# Patient Record
Sex: MALE | Race: ASIAN | NOT HISPANIC OR LATINO | Employment: PART TIME | URBAN - METROPOLITAN AREA
[De-identification: names, ages, dates, MRNs, and addresses within clinical notes are randomized per-mention and may not be internally consistent; named-entity substitution may affect disease eponyms.]

---

## 2018-01-07 ENCOUNTER — APPOINTMENT (EMERGENCY)
Dept: RADIOLOGY | Facility: HOSPITAL | Age: 23
End: 2018-01-07
Payer: COMMERCIAL

## 2018-01-07 ENCOUNTER — HOSPITAL ENCOUNTER (OUTPATIENT)
Facility: HOSPITAL | Age: 23
Setting detail: OBSERVATION
Discharge: HOME | End: 2018-01-08
Attending: EMERGENCY MEDICINE | Admitting: INTERNAL MEDICINE
Payer: COMMERCIAL

## 2018-01-07 DIAGNOSIS — G40.409 GRAND MAL SEIZURE (HCC): Primary | ICD-10-CM

## 2018-01-07 LAB
ALBUMIN SERPL BCP-MCNC: 4.2 G/DL (ref 3.5–5)
ALP SERPL-CCNC: 67 U/L (ref 46–116)
ALT SERPL W P-5'-P-CCNC: 27 U/L (ref 12–78)
ANION GAP SERPL CALCULATED.3IONS-SCNC: 22 MMOL/L (ref 4–13)
AST SERPL W P-5'-P-CCNC: 25 U/L (ref 5–45)
BASOPHILS # BLD AUTO: 0.03 THOUSANDS/ΜL (ref 0–0.1)
BASOPHILS NFR BLD AUTO: 0 % (ref 0–1)
BILIRUB SERPL-MCNC: 0.33 MG/DL (ref 0.2–1)
BUN SERPL-MCNC: 14 MG/DL (ref 5–25)
CALCIUM SERPL-MCNC: 8.7 MG/DL (ref 8.3–10.1)
CHLORIDE SERPL-SCNC: 99 MMOL/L (ref 100–108)
CO2 SERPL-SCNC: 16 MMOL/L (ref 21–32)
CREAT SERPL-MCNC: 1.16 MG/DL (ref 0.6–1.3)
EOSINOPHIL # BLD AUTO: 0.33 THOUSAND/ΜL (ref 0–0.61)
EOSINOPHIL NFR BLD AUTO: 3 % (ref 0–6)
ERYTHROCYTE [DISTWIDTH] IN BLOOD BY AUTOMATED COUNT: 12.7 % (ref 11.6–15.1)
GFR SERPL CREATININE-BSD FRML MDRD: 90 ML/MIN/1.73SQ M
GLUCOSE SERPL-MCNC: 128 MG/DL (ref 65–140)
HCT VFR BLD AUTO: 47.9 % (ref 36.5–49.3)
HGB BLD-MCNC: 16.3 G/DL (ref 12–17)
LYMPHOCYTES # BLD AUTO: 5.23 THOUSANDS/ΜL (ref 0.6–4.47)
LYMPHOCYTES NFR BLD AUTO: 48 % (ref 14–44)
MAGNESIUM SERPL-MCNC: 2.5 MG/DL (ref 1.6–2.6)
MCH RBC QN AUTO: 30.9 PG (ref 26.8–34.3)
MCHC RBC AUTO-ENTMCNC: 34 G/DL (ref 31.4–37.4)
MCV RBC AUTO: 91 FL (ref 82–98)
MONOCYTES # BLD AUTO: 1.02 THOUSAND/ΜL (ref 0.17–1.22)
MONOCYTES NFR BLD AUTO: 9 % (ref 4–12)
NEUTROPHILS # BLD AUTO: 4.43 THOUSANDS/ΜL (ref 1.85–7.62)
NEUTS SEG NFR BLD AUTO: 40 % (ref 43–75)
NRBC BLD AUTO-RTO: 0 /100 WBCS
PHOSPHATE SERPL-MCNC: 4.3 MG/DL (ref 2.7–4.5)
PLATELET # BLD AUTO: 309 THOUSANDS/UL (ref 149–390)
PMV BLD AUTO: 10 FL (ref 8.9–12.7)
POTASSIUM SERPL-SCNC: 3.7 MMOL/L (ref 3.5–5.3)
PROT SERPL-MCNC: 7.9 G/DL (ref 6.4–8.2)
RBC # BLD AUTO: 5.27 MILLION/UL (ref 3.88–5.62)
SODIUM SERPL-SCNC: 137 MMOL/L (ref 136–145)
WBC # BLD AUTO: 11.1 THOUSAND/UL (ref 4.31–10.16)

## 2018-01-07 PROCEDURE — 84100 ASSAY OF PHOSPHORUS: CPT | Performed by: EMERGENCY MEDICINE

## 2018-01-07 PROCEDURE — 93005 ELECTROCARDIOGRAM TRACING: CPT

## 2018-01-07 PROCEDURE — 36415 COLL VENOUS BLD VENIPUNCTURE: CPT | Performed by: EMERGENCY MEDICINE

## 2018-01-07 PROCEDURE — 70450 CT HEAD/BRAIN W/O DYE: CPT

## 2018-01-07 PROCEDURE — 80053 COMPREHEN METABOLIC PANEL: CPT | Performed by: EMERGENCY MEDICINE

## 2018-01-07 PROCEDURE — 85025 COMPLETE CBC W/AUTO DIFF WBC: CPT | Performed by: EMERGENCY MEDICINE

## 2018-01-07 PROCEDURE — 96365 THER/PROPH/DIAG IV INF INIT: CPT

## 2018-01-07 PROCEDURE — 83735 ASSAY OF MAGNESIUM: CPT | Performed by: EMERGENCY MEDICINE

## 2018-01-07 RX ORDER — ESCITALOPRAM OXALATE 20 MG/1
5 TABLET ORAL DAILY
COMMUNITY
End: 2018-05-24

## 2018-01-07 RX ORDER — LORAZEPAM 2 MG/ML
INJECTION INTRAMUSCULAR
Status: DISPENSED
Start: 2018-01-07 | End: 2018-01-08

## 2018-01-07 RX ADMIN — SODIUM CHLORIDE 1000 MG: 900 INJECTION, SOLUTION INTRAVENOUS at 23:11

## 2018-01-07 NOTE — Clinical Note
Case was discussed with Dr Aron Parrish and the patient's admission status was agreed to be Observation to the service of Dr Aron Parrish

## 2018-01-07 NOTE — LETTER
January 8, 2018     Patient: Bree Goldsmith   YOB: 1995   Date of Visit: 1/7/2018       To Whom It May Concern: It is my medical opinion that Bree Goldsmith should remain out of work until 1/15/18  If you have any questions or concerns, please don't hesitate to call           Sincerely,          Deneen Morris PA-C

## 2018-01-08 ENCOUNTER — APPOINTMENT (OUTPATIENT)
Dept: RADIOLOGY | Facility: HOSPITAL | Age: 23
End: 2018-01-08
Payer: COMMERCIAL

## 2018-01-08 ENCOUNTER — APPOINTMENT (OUTPATIENT)
Dept: NEUROLOGY | Facility: AMBULATORY SURGERY CENTER | Age: 23
End: 2018-01-08
Payer: COMMERCIAL

## 2018-01-08 ENCOUNTER — GENERIC CONVERSION - ENCOUNTER (OUTPATIENT)
Dept: OTHER | Facility: OTHER | Age: 23
End: 2018-01-08

## 2018-01-08 VITALS
HEIGHT: 71 IN | SYSTOLIC BLOOD PRESSURE: 110 MMHG | HEART RATE: 75 BPM | RESPIRATION RATE: 16 BRPM | OXYGEN SATURATION: 96 % | TEMPERATURE: 97.6 F | DIASTOLIC BLOOD PRESSURE: 61 MMHG | BODY MASS INDEX: 17.92 KG/M2 | WEIGHT: 128 LBS

## 2018-01-08 PROBLEM — F41.9 ANXIETY: Status: ACTIVE | Noted: 2018-01-08

## 2018-01-08 PROBLEM — F90.9 ADHD: Status: ACTIVE | Noted: 2018-01-08

## 2018-01-08 PROBLEM — R56.9 SEIZURE (HCC): Status: ACTIVE | Noted: 2018-01-08

## 2018-01-08 PROBLEM — F84.0 AUTISM SPECTRUM: Status: ACTIVE | Noted: 2018-01-08

## 2018-01-08 PROBLEM — G40.409 GRAND MAL SEIZURE (HCC): Status: ACTIVE | Noted: 2018-01-08

## 2018-01-08 PROBLEM — G47.09 OTHER INSOMNIA: Status: ACTIVE | Noted: 2018-01-08

## 2018-01-08 LAB
AMPHETAMINES SERPL QL SCN: POSITIVE
ANION GAP SERPL CALCULATED.3IONS-SCNC: 6 MMOL/L (ref 4–13)
ATRIAL RATE: 105 BPM
ATRIAL RATE: 96 BPM
BARBITURATES UR QL: NEGATIVE
BENZODIAZ UR QL: NEGATIVE
BILIRUB UR QL STRIP: NEGATIVE
BUN SERPL-MCNC: 16 MG/DL (ref 5–25)
CALCIUM SERPL-MCNC: 8.4 MG/DL (ref 8.3–10.1)
CHLORIDE SERPL-SCNC: 106 MMOL/L (ref 100–108)
CLARITY UR: CLEAR
CO2 SERPL-SCNC: 27 MMOL/L (ref 21–32)
COCAINE UR QL: NEGATIVE
COLOR UR: YELLOW
CREAT SERPL-MCNC: 0.89 MG/DL (ref 0.6–1.3)
ERYTHROCYTE [DISTWIDTH] IN BLOOD BY AUTOMATED COUNT: 12.6 % (ref 11.6–15.1)
GFR SERPL CREATININE-BSD FRML MDRD: 123 ML/MIN/1.73SQ M
GLUCOSE SERPL-MCNC: 111 MG/DL (ref 65–140)
GLUCOSE UR STRIP-MCNC: NEGATIVE MG/DL
HCT VFR BLD AUTO: 42.3 % (ref 36.5–49.3)
HGB BLD-MCNC: 15 G/DL (ref 12–17)
HGB UR QL STRIP.AUTO: NEGATIVE
KETONES UR STRIP-MCNC: NEGATIVE MG/DL
LEUKOCYTE ESTERASE UR QL STRIP: NEGATIVE
MAGNESIUM SERPL-MCNC: 2.7 MG/DL (ref 1.6–2.6)
MCH RBC QN AUTO: 30.8 PG (ref 26.8–34.3)
MCHC RBC AUTO-ENTMCNC: 35.5 G/DL (ref 31.4–37.4)
MCV RBC AUTO: 87 FL (ref 82–98)
METHADONE UR QL: NEGATIVE
NITRITE UR QL STRIP: NEGATIVE
OPIATES UR QL SCN: NEGATIVE
P AXIS: 78 DEGREES
P AXIS: 82 DEGREES
PCP UR QL: NEGATIVE
PH UR STRIP.AUTO: 5.5 [PH] (ref 4.5–8)
PHOSPHATE SERPL-MCNC: 4.7 MG/DL (ref 2.7–4.5)
PLATELET # BLD AUTO: 270 THOUSANDS/UL (ref 149–390)
PMV BLD AUTO: 9.3 FL (ref 8.9–12.7)
POTASSIUM SERPL-SCNC: 3.9 MMOL/L (ref 3.5–5.3)
PR INTERVAL: 116 MS
PR INTERVAL: 146 MS
PROT UR STRIP-MCNC: NEGATIVE MG/DL
QRS AXIS: 86 DEGREES
QRS AXIS: 90 DEGREES
QRSD INTERVAL: 114 MS
QRSD INTERVAL: 118 MS
QT INTERVAL: 326 MS
QT INTERVAL: 328 MS
QTC INTERVAL: 414 MS
QTC INTERVAL: 430 MS
RBC # BLD AUTO: 4.87 MILLION/UL (ref 3.88–5.62)
SODIUM SERPL-SCNC: 139 MMOL/L (ref 136–145)
SP GR UR STRIP.AUTO: 1.01 (ref 1–1.03)
T WAVE AXIS: 64 DEGREES
T WAVE AXIS: 71 DEGREES
THC UR QL: NEGATIVE
UROBILINOGEN UR QL STRIP.AUTO: 0.2 E.U./DL
VENTRICULAR RATE: 105 BPM
VENTRICULAR RATE: 96 BPM
WBC # BLD AUTO: 13.81 THOUSAND/UL (ref 4.31–10.16)

## 2018-01-08 PROCEDURE — A9585 GADOBUTROL INJECTION: HCPCS | Performed by: EMERGENCY MEDICINE

## 2018-01-08 PROCEDURE — 36415 COLL VENOUS BLD VENIPUNCTURE: CPT | Performed by: INTERNAL MEDICINE

## 2018-01-08 PROCEDURE — 84100 ASSAY OF PHOSPHORUS: CPT | Performed by: INTERNAL MEDICINE

## 2018-01-08 PROCEDURE — 80307 DRUG TEST PRSMV CHEM ANLYZR: CPT | Performed by: EMERGENCY MEDICINE

## 2018-01-08 PROCEDURE — 99285 EMERGENCY DEPT VISIT HI MDM: CPT

## 2018-01-08 PROCEDURE — 83735 ASSAY OF MAGNESIUM: CPT | Performed by: INTERNAL MEDICINE

## 2018-01-08 PROCEDURE — 70553 MRI BRAIN STEM W/O & W/DYE: CPT

## 2018-01-08 PROCEDURE — 85027 COMPLETE CBC AUTOMATED: CPT | Performed by: INTERNAL MEDICINE

## 2018-01-08 PROCEDURE — 95816 EEG AWAKE AND DROWSY: CPT

## 2018-01-08 PROCEDURE — 81003 URINALYSIS AUTO W/O SCOPE: CPT | Performed by: INTERNAL MEDICINE

## 2018-01-08 PROCEDURE — 80048 BASIC METABOLIC PNL TOTAL CA: CPT | Performed by: INTERNAL MEDICINE

## 2018-01-08 RX ORDER — SODIUM CHLORIDE 9 MG/ML
75 INJECTION, SOLUTION INTRAVENOUS CONTINUOUS
Status: DISCONTINUED | OUTPATIENT
Start: 2018-01-08 | End: 2018-01-08 | Stop reason: HOSPADM

## 2018-01-08 RX ORDER — ONDANSETRON 2 MG/ML
4 INJECTION INTRAMUSCULAR; INTRAVENOUS EVERY 6 HOURS PRN
Status: DISCONTINUED | OUTPATIENT
Start: 2018-01-08 | End: 2018-01-08 | Stop reason: HOSPADM

## 2018-01-08 RX ORDER — LEVETIRACETAM 750 MG/1
750 TABLET ORAL 2 TIMES DAILY
Qty: 60 TABLET | Refills: 0 | Status: SHIPPED | OUTPATIENT
Start: 2018-01-08 | End: 2018-02-15 | Stop reason: SDUPTHER

## 2018-01-08 RX ORDER — LEVETIRACETAM 750 MG/1
750 TABLET ORAL 2 TIMES DAILY
Status: DISCONTINUED | OUTPATIENT
Start: 2018-01-08 | End: 2018-01-08 | Stop reason: HOSPADM

## 2018-01-08 RX ORDER — LORAZEPAM 2 MG/ML
1 INJECTION INTRAMUSCULAR EVERY 8 HOURS PRN
Status: DISCONTINUED | OUTPATIENT
Start: 2018-01-08 | End: 2018-01-08 | Stop reason: HOSPADM

## 2018-01-08 RX ADMIN — SODIUM CHLORIDE 75 ML/HR: 0.9 INJECTION, SOLUTION INTRAVENOUS at 03:32

## 2018-01-08 RX ADMIN — LEVETIRACETAM 750 MG: 750 TABLET ORAL at 12:23

## 2018-01-08 RX ADMIN — GADOBUTROL 5.81 ML: 604.72 INJECTION INTRAVENOUS at 08:49

## 2018-01-08 NOTE — CASE MANAGEMENT
Initial Clinical Review    Admission: Date/Time/Statement: Observation 1/8 @ 0016    Orders Placed This Encounter   Procedures    Place in Observation (expected length of stay for this patient is less than two midnights)     Standing Status:   Standing     Number of Occurrences:   1     Order Specific Question:   Admitting Physician     Answer:   Shadi Pedraza [[id number]]     Order Specific Question:   Level of Care     Answer:   Med Surg [[id number]]       ED: Date/Time/Mode of Arrival:   ED Arrival Information     Expected Arrival Acuity Means of Arrival Escorted By Service Admission Type    - 1/7/2018 21:33 [facility]    Arrival Complaint    seizure          Chief Complaint:   Chief Complaint   Patient presents with    Seizure - New Onset     pt was at his friends house when he fell the floor and had a seizure  pt reports hitting his head when he fell  pt poor historian       History of Illness: 23 y o  male who is [place] by origin and was adopted by parents not knowing what family history the patient may have  However, they deny any cardiac abnormalities, seizure episodes during infancy; and developmentally may have some minor problems being a high functioning autistic individual, ADHD with anxiety  The patient has been on his current ADHD medication without any problem for the past 10 years  Pt was diagnosed with anxiety, the patient was placed on Lexapro for the past 4 months without any problem      Reportedly, the patient has trouble with sleeping and possibly also brought about by his level of video games  However last night, the patient was able to sleep from 12 midnight to 7 in the morning without any problem    The patient this evening was in his friend's house and not exactly knowing what happened as per the adoptive parents, they were said to be having a good time and eating some chips when suddenly he had some shaking episodes first of the upper extremities and then developed into full grand tonic-clonic seizures  It was unknown just how long the episode lasted  However, the patient was brought to the emergency room and was already communicative and seemingly back to baseline      While the patient was in the emergency room being evaluated, he was talking to his parents when suddenly he began to have twitching and repeated shaking of his head looking towards the left side as he was conversing  Suddenly, this developed into a full body tonic-clonic grand mal seizure which lasted approximately 3 minutes  The patient was given Ativan 1 mg which did not controlled to tonic-clonic movements        Currently, patient is drowsy and with sternal rub will react a little bit to the examiner  The patient also avoids his face when the extremity is let go        ED Vital Signs:   ED Triage Vitals   Temperature Pulse Respirations Blood Pressure SpO2   01/07/18 2138 01/07/18 2136 01/07/18 2136 01/07/18 2136 01/07/18 2136   97 6 °F (36 4 °C) 87 18 102/69 98 %      Temp src Heart Rate Source Patient Position - Orthostatic VS BP Location FiO2 (%)   -- 01/07/18 2136 01/07/18 2136 01/07/18 2136 --    Monitor Lying Right arm       Pain Score       01/07/18 2136       No Pain        Wt Readings from Last 1 Encounters:   01/07/18 58 1 kg (128 lb)       Vital Signs (abnormal): WNL    Abnormal Labs:    01/08/18 0433    WBC 4 31 - 10 16 Thousand/uL 13 81        01/08/18 0433    Magnesium 1 6 - 2 6 mg/dL 2 7       01/08/18 0433     Phosphorus 2 7 - 4 5 mg/dL 4 7       Diagnostic Test Results: CT Head - No acute intracranial abnormality  ED Treatment:   Medication Administration from 01/07/2018 2133 to 01/08/2018 0826 01/07/2018 2311 levETIRAcetam (KEPPRA) 1,000 mg in sodium chloride 0 9 % 100 mL IVPB 1,000 mg Intravenous New Bag     01/08/2018 0332 sodium chloride 0 9 % infusion 75 mL/hr Intravenous New Bag       Past Medical/Surgical History:    Active Ambulatory Problems Diagnosis Date Noted    Autism spectrum 01/08/2018     Resolved Ambulatory Problems     Diagnosis Date Noted    No Resolved Ambulatory Problems     Past Medical History:   Diagnosis Date    ADHD     Anxiety        Admitting Diagnosis: Seizure ([address] ) [R56 9]    Age/Sex: 23 y o  male    Assessment/Plan:   * Grand mal seizure [facility])   Assessment & Plan     Observation, telemetry  Neurology consult  MRI of the brain to better see parenchymal changes  EEG routine  Patient given Keppra 1 g here in the emergency room  Will look forward to neurology's opinion whether he patient needs maintenance medication  Ativan ordered for any intermittent seizure activity  Respiratory support  Seizure precautions  Patient is currently NPO        Other insomnia   Assessment & Plan     Currently patient is sleeping from the Ativan and Keppra administered  Patient may need improvement in sleep hygiene        ADHD   Assessment & Plan     Patient is currently on lisdexamphetamine  Will put on hold        Anxiety   Assessment & Plan     Patient is currently on Lexapro 20 mg daily which was started 4 months ago  Will put on hold  Also possibility of Lexapro lowering seizure threshold is entertained  Meanwhile, as patient is drowsy from the Ativan administered, patient cannot take any po medications             VTE Prophylaxis: low risk  / sequential compression device   Code Status: Level 1 - Full Code   POLST: There is no POLST form on file for this patient (pre-hospital)     Anticipated Length of Stay:  Patient will be admitted on an Observation basis with an anticipated length of stay of  Greater than 2 midnights     Justification for Hospital Stay: Please see detailed plans noted above          Admission Orders:  NPO; Sips with meds  Tele monitoring  Continuous Pulse Oximetry  Neurological checks q4h x24h  Seizure precautions  EEG  Neurology cons    Scheduled Meds:   LORazepam        Continuous Infusions:   sodium chloride 75 mL/hr Last Rate: 75 mL/hr (01/08/18 0332)     PRN Meds: LORazepam    ondansetron

## 2018-01-08 NOTE — ED NOTES
pts family come to nurses desk for pt eval  Pt actively seizing  Pt placed on side, non rebreather mask on pt, pt suctioned  Dr Jamie Santoyo and Anastasia Kaur at beside 2mg ativan given       Deidre Davila RN  01/07/18 3574

## 2018-01-08 NOTE — PROCEDURES
ELECTROENCEPHALOGRAM    Patient Name:  Sachin Funk  MRN: [id number]   :  1995 File #: Hilton Dockery    Age: 23 y o  Encounter #: [id number]   Date performed: 2018 Referring Provider: Dr Silas Harris          Report date: 2018          Study type: awake and drowsy EEG    ICD 10 diagnosis: Spells/Fit NOS R56 9    Patient History:  EEG is requested to assess for seizures and/or classification of epilepsy  Patient is 23 y o  male admitted to hospital after a seizure  He was having shaking of his upper extremities that progressed to a generalized tonic-clonic seizure  During the his stay in the emergency department he began twitching and had repeated movements of his head to the left while conversing with his parents  Suddenly he had a full body tonic-clonic generalized convulsion  He has a history of ADHD and anxiety  Current AEDs:  Levetiracetam  Medications include:  Zofran, Lexapro, Vyvanse    Description of Procedure: The EEG was performed with electrodes applied using the International 10-20 System  Additional electrodes used included EOG, ECG and T1/T2 electrodes  A single lead ECG channel is also present  At least 16 channels are reviewed at a time and formatted into longitudinal bipolar, transverse bipolar, and referential (to common reference or calculated common reference) montages  The EEG was recorded with the patient awake, drowsy, and asleep  The recording was technically satisfactory  EEG was recorded from 11:07 to 11:42  Findings:   Background Activity: The background is grossly symmetric with respect to voltages and activities  During wakefulness, the background is well-organized with anterior low amplitude beta activity and low-moderate amplitude posterior alpha activity  There is a symmetric 10 5-11 Hz posterior dominant rhythm that attenuates with eye opening        Drowsiness is characterized by attentuation of the alpha rhythm, prominent anterior beta activity, central theta activity, roving eye movements and vertex waves  Stage 2 sleep is characterized by symmetric sleep spindles and K-complexes  During stage 2 sleep, there are episodic movements of the legs and body that caused deflections on the right hemispheric derivations, consistent with movement artifact  Activation Procedures:   Hyperventilation was performed with good effort up to 4 minutes and did not produce any abnormalities  Stepped photic stimulation was not completed  During photic stimulation P3 and F8 electrodes popped off causing an artifact on the average referential montage  Other findings: The single lead ECG shows a regular and sinus rhythm  Interpretation: This is a normal 35 minutes awake, drowsy, and asleep EEG  The lack of epileptiform discharges on a routine EEG does not preclude the diagnosis of seizures or epilepsy  Due to recurrent / periodic movements of the limbs, may consider formal sleep study to assess for periodic limb movement disorder      MD [facility]  [address] [place] [address]

## 2018-01-08 NOTE — ASSESSMENT & PLAN NOTE
Observation, telemetry  Neurology consult  MRI of the brain to better see parenchymal changes  EEG routine  Patient given Keppra 1 g here in the emergency room  Will look forward to neurology's opinion whether he patient needs maintenance medication  Ativan ordered for any intermittent seizure activity  Respiratory support  Seizure precautions  Patient is currently NPO

## 2018-01-08 NOTE — PLAN OF CARE
DISCHARGE PLANNING     Discharge to home or other facility with appropriate resources Progressing        Knowledge Deficit     Patient/family/caregiver demonstrates understanding of disease process, treatment plan, medications, and discharge instructions Progressing        NEUROSENSORY - ADULT     Achieves stable or improved neurological status Progressing     Absence of seizures Progressing        SAFETY ADULT     Patient will remain free of falls Progressing

## 2018-01-08 NOTE — DISCHARGE INSTRUCTIONS
Epilepsy   WHAT YOU NEED TO KNOW:   Epilepsy is a brain disorder that causes seizures  It is also called a seizure disorder  A seizure means an abnormal area in your brain sometimes sends bursts of electrical activity  A seizure may start in one part of your brain, or both sides may be affected  Depending on the type of seizure, you may have movements you cannot control, lose consciousness, or stare straight ahead  You may be confused or tired after the seizure  A seizure may last a few seconds or longer than 5 minutes  A birth defect, tumor, stroke, dementia, injury, or infection may cause epilepsy  The cause of your epilepsy may not be known  If your seizures are not controlled, epilepsy may become life-threatening  DISCHARGE INSTRUCTIONS:   Call 911 for any of the following:   · Your seizure lasts longer than 5 minutes  · You have trouble breathing after a seizure  · You have diabetes or are pregnant and have a seizure  · You have a seizure in water, such as a swimming pool or bathtub  Seek care immediately if:   · You have a second seizure within 24 hours of the first      · You are injured during a seizure  Contact your healthcare provider if:   · You feel you are not able to cope with your condition  · Your seizures start to happen more often  · You are confused longer than usual after a seizure  · You are planning to get pregnant or are currently pregnant  · You have questions or concerns about your condition or care  Medicines:   · Antiseizure medicine  may control or prevent another seizure  Do not stop taking this medicine  Another person may need to give you rescue medicine to stop a seizure at home  Ask your healthcare provider for more information about rescue medicine  · Take your medicine as directed  Contact your healthcare provider if you think your medicine is not helping or if you have side effects  Tell him of her if you are allergic to any medicine   Keep a list of the medicines, vitamins, and herbs you take  Include the amounts, and when and why you take them  Bring the list or the pill bottles to follow-up visits  Carry your medicine list with you in case of an emergency  Follow up with your neurologist as directed: You may need tests to check the level of antiseizure medicine in your blood  Your neurologist may need to change or adjust your medicine  Write down your questions so you remember to ask them during your visits  What you can do to prevent a seizure: You may not be able to prevent every seizure  The following can help you manage triggers that may make a seizure start:  · Take your medicine every day at the same time  This will also help prevent medicine side effects  Set an alarm to help remind you to take your medicine every day  · Manage stress  Stress can be a trigger for epilepsy  Exercise can help you reduce stress  Talk to your healthcare provider about exercise that is safe for you  Illness can be a form of stress  Eat a variety of healthy foods and drink plenty of liquids during an illness  Talk to your healthcare provider about other ways to manage stress  · Set a regular sleep schedule  A lack of sleep can trigger a seizure  Try to go to sleep and wake up at the same time every day  Keep your bedroom quiet and dark  Talk to your healthcare provider if you are having trouble sleeping  · Limit or do not drink alcohol as directed  Alcohol can trigger a seizure, especially if you drink a large amount at one time  A drink of alcohol is 12 ounces of beer, 1½ ounces of liquor, or 5 ounces of wine  Talk to your healthcare provider about a safe amount of alcohol for you  Your provider may recommend that you do not drink any alcohol  Tell him or her if you need help to quit drinking  What you can do to manage epilepsy:   · Keep a seizure diary  This can help you find your triggers and avoid them   Write down the dates of your seizures, where you were, and what you were doing  Include how you felt before and after  Possible triggers include illness, lack of sleep, hormonal changes, alcohol, drugs, lights, or stress  · Record any auras you have before a seizure  An aura is a sign that you are about to have a seizure  Auras happen before certain types of seizures that are in only 1 part of the brain  The aura may happen seconds before a seizure, or up to an hour before  You may feel, see, hear, or smell something  Examples include part of your body becoming hot  You may see a flash of light or hear something  You may have anxiety or déjà vu  If you have an aura, include it in your seizure diary  · Create a care plan  Tell family, friends, and coworkers about your epilepsy  Give them instructions that tell them how they can keep you safe if you have a seizure  · Find support  You may be referred to a psychologist or   Ask your healthcare provider about support groups for people with epilepsy  · Ask what safety precautions you should take  Talk with your healthcare provider about driving  You may not be able to drive until you are seizure-free for a period of time  You will need to check the law where you live  Also talk to your healthcare provider about swimming and bathing  You may drown or develop life-threatening heart or lung damage if you have a seizure in water  · Carry medical alert identification  Wear medical alert jewelry or carry a card that says you have epilepsy  Ask your healthcare provider where to get these items  How others can keep you safe during a seizure:  Give the following instructions to family, friends, and coworkers:  · Do not panic  · Do not hold me down or put anything in my mouth  · Gently guide me to the floor or a soft surface  · Place me on my side to help prevent me from swallowing saliva or vomit  · Protect me from injury   Remove sharp or hard objects from the area surrounding me, or cushion my head  · Loosen the clothing around my head and neck  · Time how long my seizure lasts  Call 911 if my seizure lasts longer than 5 minutes or if I have a second seizure  · Stay with me until my seizure ends  Let me rest until I am fully awake  · Perform CPR if I stop breathing or you cannot feel my pulse  · Do not give me anything to eat or drink until I am fully awake  © 2017 [facility] Information is for End User's use only and may not be sold, redistributed or otherwise used for commercial purposes  All illustrations and images included in CareNotes® are the copyrighted property of [employer]  or [address]  The above information is an  only  It is not intended as medical advice for individual conditions or treatments  Talk to your doctor, nurse or pharmacist before following any medical regimen to see if it is safe and effective for you

## 2018-01-08 NOTE — CONSULTS
PATIENT NAME: Tj Hill  YOB: 1995   MEDICAL RECORD NUMBER: [id number]  Chief Complaint/Reason for Consult: seizure    HPI: Tj Hill is a 23 y o  adopted [place] male with new onset seizure last night which reportedly occurred while playing video games with 2 of his friends  He first felt a semi familiar feeling of "his mind not being there" and the need to turn his head to left which happen simultaneously  He then remembers starting to shake primarily in his arms and then falling to the ground  The next thing he remembers is waking up with his EMT's, feeling confused, slurring speech and feeling diffusely  He had another seizure like event upon arrival  He had no associated tongue biting or incontinence  He is back to baseline  Starting about a year ago he started to have a feeling that "he can't control his mind" and his head would turn to the left  6 months ago it became more frequent but no overt seizures with this  The events would happen once every few weeks  He had some nausea/vomiting new years catherine  He denied any focal neurological complaints  He was adopted at 9 months, no febrile seizures, no traditional risk factors  Needed some therapy when under 7 but otherwise was in normal school  Has not been doing well in college mainly due to poor attendance but no clear cut intellectual disabilities  PAST MEDICAL HISTORY  Past Medical History:   Diagnosis Date    ADHD     Anxiety         PAST SURGICAL HISTORY  History reviewed  No pertinent surgical history  ALLERGIES: Patient has no known allergies  CURRENT MEDICATIONS  Scheduled Meds:   Continuous Infusions:  sodium chloride 75 mL/hr Last Rate: 75 mL/hr (01/08/18 0332)     PRN Meds:   LORazepam    ondansetron     SOCIAL HISTORY   reports that he has never smoked  He has never used smokeless tobacco  He reports that he does not drink alcohol or use drugs  College student  FAMILY HISTORY  Adopted       REVIEW OF SYSTEMS   Constitutional: Negative for fever, chills, diaphoresis, activity change and appetite change  HEENT: Negative for hearing loss, ear pain, facial swelling, neck pain, neck stiffness, tinnitus and ear discharge  Negative for ocular pain, discharge, redness and itching  Respiratory: Negative for apnea, chest tightness, shortness of breath, wheezing and stridor  Cardiovascular: Negative for chest pain, palpitations and leg swelling  Gastrointestinal: Negative for diarrhea, constipation and abdominal distention  Endocrine: Negative for cold intolerance and heat intolerance  Genitourinary: Negative for dysuria, urgency, frequency, hematuria, flank pain and difficulty urinating  Neurological: Negative for dizziness, seizures, syncope, facial asymmetry, speech difficulty, light-headedness, numbness and headaches  Hematological: Negative for adenopathy  Does not bruise/bleed easily  Psychiatric/Behavioral: Negative for behavioral problems and agitation  Otherwise complete review of systems is negative aside from what is mentioned above and in the HPI  PHYSICAL EXAMINATION  Temp:  [97 6 °F (36 4 °C)] 97 6 °F (36 4 °C)  HR:  [68-98] 70  Resp:  [12-20] 18  BP: (101-117)/(57-70) 106/64   General Examination: In no apparent distress, well developed and well nourished, and cooperative   HEENT: Normocephalic, Atraumatic  Moist mucus membranes  Anicteric  PPC    CVS: Regular rate and rhythm  S1 S2 noted  No audible murmurs  No carotids bruits  Peripheral pulses palpable throughout   Lungs: Clear to auscultation bilaterally  No rales, rhonchi, wheezing  Abdomen: Bowel sounds positive  Non- tender  Non-distended  No organomegaly  Ext: No edema   Psych: Thought content - No VH/AH  No delusions  Though Process - logical    Skin - No rash    Neurological Examination:   Mental Status: The patient was awake, alert, attentive, oriented to person, place, and time   Recent and remote memory intact to conversation with no evidence of language dysfunction  Satisfactory fund of knowledge  Normal attention span and concentration  Able to name, repeat, describe a complex scene  Cranial Nerves:   I: smell Not tested   II: visual fields Full to confrontation  Pupils equal, round, reactive to light with normal accomodation  Fundus: benign fundus  III,IV,VI: extraocular muscles EOMI, no nystagmus   V: masseter and pterygoid strength full  Sensation in the V1 through V3 distributions intact to pinprick and light touch bilaterally  VII: Face is symmetric with no weakness noted  VIII: Audition intact to finger rub bilaterally  IX/X: Uvula midline  Soft palate elevation symmetric  XI: Trapezius and SCM strength 5/5 B/L  XII: Tongue midline with no atrophy or fasciculations with appropriate movement  Motor Examination:   No pronator drift  Bulk: Normal  No atrophy Tone: Normal  Fasciculations: None  Deltoid Biceps Triceps WE   WF   FF IO     Right        5         5          5         5      5      5   5        Left           5        5          5          5      5     5   5                       IP        Quad   Ham     TA       Gastroc   Right      5            5          5         5                5  Left         5            5         5         5                5       Reflexes:                   Biceps Brachioradialis Triceps Patella Achilles Plantars   Right          2+            2+                  2+        3+       4         Down   Left            2+             2+                 2+         3+       4         Down     Clonus: few beats b/l at ankles  Pathological Reflexes:  Hoffmans: negative  Babinsky: negative  Jaw Jerk: negative    Coordination: Patient able to perform normal finger-to-nose and heel to shin appropriately  Normal rapid alternating movements  Sensory: Normal sensation to light touch, pin prick and vibratory sensation throughout       Gait:about to get an EEG, unable to ambulate       Labs:  Recent Results (from the past 24 hour(s))   ECG 12 lead    Collection Time: 01/07/18  9:59 PM   Result Value Ref Range    Ventricular Rate 96 BPM    Atrial Rate 96 BPM    OR Interval 146 ms    QRSD Interval 118 ms    QT Interval 328 ms    QTC Interval 414 ms    P Axis 82 degrees    QRS Axis 90 degrees    T Wave Axis 71 degrees   ECG 12 lead    Collection Time: 01/07/18 10:03 PM   Result Value Ref Range    Ventricular Rate 105 BPM    Atrial Rate 105 BPM    OR Interval 116 ms    QRSD Interval 114 ms    QT Interval 326 ms    QTC Interval 430 ms    P Axis 78 degrees    QRS Axis 86 degrees    T Wave Axis 64 degrees   Comprehensive metabolic panel    Collection Time: 01/07/18 10:30 PM   Result Value Ref Range    Sodium 137 136 - 145 mmol/L    Potassium 3 7 3 5 - 5 3 mmol/L    Chloride 99 (L) 100 - 108 mmol/L    CO2 16 (L) 21 - 32 mmol/L    Anion Gap 22 (H) 4 - 13 mmol/L    BUN 14 5 - 25 mg/dL    Creatinine 1 16 0 60 - 1 30 mg/dL    Glucose 128 65 - 140 mg/dL    Calcium 8 7 8 3 - 10 1 mg/dL    AST 25 5 - 45 U/L    ALT 27 12 - 78 U/L    Alkaline Phosphatase 67 46 - 116 U/L    Total Protein 7 9 6 4 - 8 2 g/dL    Albumin 4 2 3 5 - 5 0 g/dL    Total Bilirubin 0 33 0 20 - 1 00 mg/dL    eGFR 90 ml/min/1 73sq m   Magnesium    Collection Time: 01/07/18 10:30 PM   Result Value Ref Range    Magnesium 2 5 1 6 - 2 6 mg/dL   Phosphorus    Collection Time: 01/07/18 10:30 PM   Result Value Ref Range    Phosphorus 4 3 2 7 - 4 5 mg/dL   CBC and differential    Collection Time: 01/07/18 10:30 PM   Result Value Ref Range    WBC 11 10 (H) 4 31 - 10 16 Thousand/uL    RBC 5 27 3 88 - 5 62 Million/uL    Hemoglobin 16 3 12 0 - 17 0 g/dL    Hematocrit 47 9 36 5 - 49 3 %    MCV 91 82 - 98 fL    MCH 30 9 26 8 - 34 3 pg    MCHC 34 0 31 4 - 37 4 g/dL    RDW 12 7 11 6 - 15 1 %    MPV 10 0 8 9 - 12 7 fL    Platelets 791 576 - 985 Thousands/uL    nRBC 0 /100 WBCs    Neutrophils Relative 40 (L) 43 - 75 %    Lymphocytes Relative 48 (H) 14 - 44 %    Monocytes Relative 9 4 - 12 %    Eosinophils Relative 3 0 - 6 %    Basophils Relative 0 0 - 1 %    Neutrophils Absolute 4 43 1 85 - 7 62 Thousands/µL    Lymphocytes Absolute 5 23 (H) 0 60 - 4 47 Thousands/µL    Monocytes Absolute 1 02 0 17 - 1 22 Thousand/µL    Eosinophils Absolute 0 33 0 00 - 0 61 Thousand/µL    Basophils Absolute 0 03 0 00 - 0 10 Thousands/µL   Basic metabolic panel    Collection Time: 01/08/18  4:33 AM   Result Value Ref Range    Sodium 139 136 - 145 mmol/L    Potassium 3 9 3 5 - 5 3 mmol/L    Chloride 106 100 - 108 mmol/L    CO2 27 21 - 32 mmol/L    Anion Gap 6 4 - 13 mmol/L    BUN 16 5 - 25 mg/dL    Creatinine 0 89 0 60 - 1 30 mg/dL    Glucose 111 65 - 140 mg/dL    Calcium 8 4 8 3 - 10 1 mg/dL    eGFR 123 ml/min/1 73sq m   Magnesium    Collection Time: 01/08/18  4:33 AM   Result Value Ref Range    Magnesium 2 7 (H) 1 6 - 2 6 mg/dL   Phosphorus    Collection Time: 01/08/18  4:33 AM   Result Value Ref Range    Phosphorus 4 7 (H) 2 7 - 4 5 mg/dL   CBC (With Platelets)    Collection Time: 01/08/18  4:33 AM   Result Value Ref Range    WBC 13 81 (H) 4 31 - 10 16 Thousand/uL    RBC 4 87 3 88 - 5 62 Million/uL    Hemoglobin 15 0 12 0 - 17 0 g/dL    Hematocrit 42 3 36 5 - 49 3 %    MCV 87 82 - 98 fL    MCH 30 8 26 8 - 34 3 pg    MCHC 35 5 31 4 - 37 4 g/dL    RDW 12 6 11 6 - 15 1 %    Platelets 177 791 - 622 Thousands/uL    MPV 9 3 8 9 - 12 7 fL            panel  Results from last 7 days  Lab Units 01/08/18  0433   SODIUM mmol/L 139   POTASSIUM mmol/L 3 9   CHLORIDE mmol/L 106   GLUCOSE RANDOM mg/dL 111   BUN mg/dL 16   CREATININE mg/dL 0 89      Results from last 7 days  Lab Units 01/08/18  0433   HEMATOCRIT % 42 3   HEMOGLOBIN g/dL 15 0   MCH pg 30 8   MCHC g/dL 35 5   MCV fL 87   MPV fL 9 3   PLATELETS Thousands/uL 270   RDW % 12 6   WBC Thousand/uL 13 81*          Invalid input(s): LABPT   Results from last 7 days  Lab Units 01/08/18  0433   CO2 mmol/L 27   BUN mg/dL 16   CREATININE mg/dL 0 89   GLUCOSE RANDOM mg/dL 111    Lab Results   Component Value Date    ALT 27 01/07/2018    AST 25 01/07/2018    ALKPHOS 67 01/07/2018          Invalid input(s): CHOLESTEROL, TRIGLYCERIDE, NONHDL No results found for: HGBA1C TSH i: No results found for: TSH Imaging: CT head     MRI brain - No acute disease  No parenchymal pathology to account for patient's symptoms  Temporal lobes are normal      Incidental 3-4 mm possible pituitary microadenoma  As deemed clinically appropriate, dedicated MR of the pituitary gland could be performed  Images personally reviewed  EEG pending      ASSESSMENT/PLAN  24 yo male with likely new onset epilepsy  May be non epileptic but unclear  MRI negative except microadenoma, can f/u for this as outpatient with any associated hormonal testing  From seizure perspective given auras over time and now clinical event recommended treatment with keppra 750 mg bid  OK for discharge if stable into afternoon  Outpatient f/u with epilepsy  He will obtain an EEG   Explained that he cannot drive and [employer] dot form filled out by primary team

## 2018-01-08 NOTE — ASSESSMENT & PLAN NOTE
Patient is currently on Lexapro 20 mg daily which was started 4 months ago  Will put on hold  Also possibility of Lexapro lowering seizure threshold is entertained  Meanwhile, as patient is drowsy from the Ativan administered, patient cannot take any po medications

## 2018-01-08 NOTE — H&P
History and Physical: Clarence Turcios 1995, 23 y o  male MRN: [id number]    Unit/Bed#: ED 09 Encounter: [id number]    Primary Care Provider: Noemi Cho MD   Date and time admitted to hospital: 1/7/2018  9:33 PM        * Grand mal seizure [facility])   Assessment & Plan    Observation, telemetry  Neurology consult  MRI of the brain to better see parenchymal changes  EEG routine  Patient given Keppra 1 g here in the emergency room  Will look forward to neurology's opinion whether he patient needs maintenance medication  Ativan ordered for any intermittent seizure activity  Respiratory support  Seizure precautions  Patient is currently NPO  Other insomnia   Assessment & Plan    Currently patient is sleeping from the Ativan and Keppra administered  Patient may need improvement in sleep hygiene  ADHD   Assessment & Plan    Patient is currently on lisdexamphetamine  Will put on hold  Anxiety   Assessment & Plan    Patient is currently on Lexapro 20 mg daily which was started 4 months ago  Will put on hold  Also possibility of Lexapro lowering seizure threshold is entertained  Meanwhile, as patient is drowsy from the Ativan administered, patient cannot take any po medications  VTE Prophylaxis: low risk  / sequential compression device   Code Status: Level 1 - Full Code   POLST: There is no POLST form on file for this patient (pre-hospital)    Anticipated Length of Stay:  Patient will be admitted on an Observation basis with an anticipated length of stay of  Greater than 2 midnights  Justification for Hospital Stay: Please see detailed plans noted above  Chief Complaint:     seizure  of Present Illness:  Clarence Turcios is a 23 y o  male who is [place] by origin and was adopted by parents not knowing what family history the patient may have    However, they deny any cardiac abnormalities, seizure episodes during infancy; and developmentally may have some minor problems being a high functioning autistic individual, ADHD with anxiety  The patient has been on his current ADHD medication without any problem for the past 10 years  Was diagnosed with anxiety, the patient was placed on Lexapro for the past 4 months without any problem  Reportedly, the patient has trouble with sleeping and possibly also brought about by his level of video games  However it last night, the patient was able to sleep from 12 midnight to 7 in the morning without any problem  The patient this evening was in his friend's house and not exactly knowing what happened as per the adoptive parents, they were said to be having a good time and eating some chips when suddenly he had some shaking episodes first of the upper extremities and then developed into full grand tonic-clonic seizures  It was unknown just how long the episode lasted  However, the patient was brought to the emergency room and was already communicative and seemingly back to baseline  While the patient was in the emergency room being evaluated, he was talking to his parents when suddenly he began to have twitching and repeated shaking of his head looking towards the left side as he was conversing  Suddenly, this developed into a full body tonic-clonic grand mal seizure which lasted approximately 3 minutes  The patient was given Ativan 1 mg which did not controlled to tonic-clonic movements  Currently, patient is drowsy and with sternal rub will react a little bit to the examiner  The patient also avoids his face when the extremity is let go  Review of Systems:  Review of systems is as per the adopted mother who is in the room    Constitutional:  Denies fever or chills   Eyes:  Denies change in visual acuity   HENT:  Denies nasal congestion or sore throat   Respiratory:  Denies cough or shortness of breath   Cardiovascular:  Denies chest pain or edema   GI:  Denies abdominal pain, nausea, vomiting, bloody stools or diarrhea   : Denies dysuria   Musculoskeletal:  Denies back pain or joint pain   Integument:  Denies rash   Neurologic:  Denies headache, focal weakness or sensory changes   Endocrine:  Denies polyuria or polydipsia   Lymphatic:  Denies swollen glands   Psychiatric:  Denies depression or anxiety     Past Medical and Surgical History:   Past Medical History:   Diagnosis Date    ADHD     Anxiety      History reviewed  No pertinent surgical history  Meds/Allergies:  Lisdexamfetamine Dimesylate (VYVANSE PO) Take by mouth Sigifredo Gamble MD Not Ordered   escitalopram (LEXAPRO) 20 mg tablet Take 5 mg by mouth daily Sigifredo Gamble MD          Allergies: No Known Allergies  History:  Marital Status: Single   Occupation: works in a [employer] ([address])  Patient Pre-hospital Living Situation: lives at home; adopted - not knowing what family history is like  Patient Pre-hospital Level of Mobility: mobile  Patient Pre-hospital Diet Restrictions: regular  Substance Use History:   History   Alcohol Use No     History   Smoking Status    Never Smoker   Smokeless Tobacco    Never Used     History   Drug Use No   avid player of [employer], may have trouble sleeping at nights    Family History:  No family history on file  Unknown - adopted  Physical Exam:     Vitals:   Blood Pressure: 101/59 (01/08/18 0015)  Pulse: 78 (01/08/18 0015)  Temperature: 97 6 °F (36 4 °C) (01/07/18 2138)  Respirations: 16 (01/08/18 0015)  Height: 5' 11" (180 3 cm) (01/07/18 2136)  Weight - Scale: 58 1 kg (128 lb) (01/07/18 2136)  SpO2: 97 % (01/08/18 0015)    Constitutional:  Well developed, well nourished, no acute distress, non-toxic appearance and sleeping  Eyes:  PERRL, conjunctiva normal   HENT:  Atraumatic, external ears normal, nose normal, oropharynx moist, no pharyngeal exudates   Neck- normal range of motion, no tenderness, supple   Respiratory:  No respiratory distress, normal breath sounds, no rales, no wheezing   Cardiovascular:  Normal rate, normal rhythm, no murmurs, no gallops, no rubs   GI:  Soft, nondistended, normal bowel sounds, nontender, no organomegaly, no mass, no rebound, no guarding   :  No costovertebral angle tenderness   Musculoskeletal:  No edema, no tenderness, no deformities  Back- no tenderness  Integument:  Well hydrated, no rash   Lymphatic:  No lymphadenopathy noted   Neurologic:  Asleep, avoids head when extremities are let go, not able to fully evaluate at this time  Psychiatric:  Unable to evaluate      Lab Results: I have personally reviewed pertinent reports  Results from last 7 days  Lab Units 01/07/18  2230   WBC Thousand/uL 11 10*   HEMOGLOBIN g/dL 16 3   HEMATOCRIT % 47 9   PLATELETS Thousands/uL 309   NEUTROS PCT % 40*   LYMPHS PCT % 48*   MONOS PCT % 9   EOS PCT % 3       Results from last 7 days  Lab Units 01/07/18  2230   SODIUM mmol/L 137   POTASSIUM mmol/L 3 7   CHLORIDE mmol/L 99*   CO2 mmol/L 16*   BUN mg/dL 14   CREATININE mg/dL 1 16   CALCIUM mg/dL 8 7   TOTAL PROTEIN g/dL 7 9   BILIRUBIN TOTAL mg/dL 0 33   ALK PHOS U/L 67   ALT U/L 27   AST U/L 25   GLUCOSE RANDOM mg/dL 128           EKG: Sinus tachycardia  Possible Left atrial enlargement  Incomplete right bundle branch block  Borderline ECG    Imaging: I have personally reviewed pertinent reports  [facility] : Head no CT evidence of acute abnormality  No results found  ** Please Note: Dragon 360 Dictation voice to text software was used in the creation of this document   **

## 2018-01-08 NOTE — ASSESSMENT & PLAN NOTE
Currently patient is sleeping from the Ativan and Keppra administered  Patient may need improvement in sleep hygiene

## 2018-01-08 NOTE — INCIDENTAL FINDINGS
The following findings require follow up:  Radiographic finding   Finding: 3-4 mm pituitary microadenoma   Follow up required: Yes   Follow up should be done within 1 month(s)    Please notify the following clinician to assist with the follow up:    Your family doctor

## 2018-01-08 NOTE — DISCHARGE SUMMARY
Discharge Summary - [address] [facility]    Patient Information: Paulina Banerjee 23 y o  male MRN: [id number]  Unit/Bed#: ED 09 Encounter: [id number]    Discharging Physician / Practitioner: Mortimer Rush, PA-C  PCP: Deborah Swan MD  Admission Date: 1/7/2018  Discharge Date: 01/08/18    Reason for Admission: New-onset seizure    Discharge Diagnoses:     Principal Problem:    Grand mal seizure [facility])  Active Problems:    Anxiety    ADHD    Other insomnia  Resolved Problems:    * No resolved hospital problems  *      Consultations During Hospital Stay:  · Neurology    Procedures Performed:     · CT head: No acute abnormality  · MRI brain: No acute disease  No parenchymal pathology to account for patient's symptoms  Incidental 3-4 mm pituitary microadenoma  Significant Findings / Test Results:     · Seizure  · UDS positive for amphetamine: Note patient takes Vyvanse for ADHD    Incidental Findings:   · 3-4 mm pituitary microadenoma- patient will have follow-up with neurology    Test Results Pending at Discharge (will require follow up):   · EEG     Outpatient Tests Requested:  · Endocrine workup of pituitary microadenoma    Complications:  None    Hospital Course:     Paulina Banerjee is a 23 y o  male patient with a history of ADHD who originally presented to the hospital on 1/7/2018 due to seizure  He has no known history of seizure disorder  Upon EMS arrival, he was noted to be postictal  Patient was brought to the emergency room and again had a witnessed grand mal seizure which resolved with administration of Ativan  He was loaded with Keppra  Of note, he was recently placed on Lexapro four months ago for anxiety  Patient was evaluated by neurology  He underwent MRI of the brain which incidentally showed 2-3 mm possible pituitary microadenoma but otherwise did not show parenchymal pathology to account for seizure  EEG was performed and results are still pending at the time of discharge   Regardless, neurology recommended starting the patient on Keppra 750 mg BID with follow-up in the epilepsy clinic  Discussed with neurology and they did not recommend stopping Lexapro at this time  [employer] form was filled out  Patient and his parents advised that he cannot drive for at least six months and voice understanding  Condition at Discharge: stable     Discharge Day Visit / Exam:     Subjective:  Initially, patient very lethargic on exam  Did arouse but unable to stay awake long to answer question  His parents note that normally he is a very heavy sleeper at home and this was not unusual for him  For that reason, ABG was attempted but patient woke up  He is now awake and alert and answering questions  Vitals: Blood Pressure: 109/58 (01/08/18 1100)  Pulse: 70 (01/08/18 1100)  Temperature: 97 6 °F (36 4 °C) (01/07/18 2138)  Respirations: 16 (01/08/18 1100)  Height: 5' 11" (180 3 cm) (01/07/18 2136)  Weight - Scale: 58 1 kg (128 lb) (01/07/18 2136)  SpO2: 99 % (01/08/18 1100)  Exam:   Physical Exam   Constitutional: No distress  HENT:   Head: Normocephalic and atraumatic  Eyes: EOM are normal  Pupils are equal, round, and reactive to light  Neck: Normal range of motion  Neck supple  Cardiovascular: Normal rate, regular rhythm and normal heart sounds  Pulmonary/Chest: Effort normal and breath sounds normal  No respiratory distress  He has no wheezes  He has no rales  Neurological:   Initially lethargic but upon re-examination he is AAOx3  Strength normal in all extremities  No focal deficits noted  Skin: Skin is warm and dry  He is not diaphoretic  Psychiatric: He has a normal mood and affect  His behavior is normal        Discussion with Family: Plan discussed with mother and father    Discharge instructions/Information to patient and family:   See after visit summary for information provided to patient and family        Provisions for Follow-Up Care:  See after visit summary for information related to follow-up care and any pertinent home health orders  Disposition:     Home    For Discharges to [facility] SNF:   · Not Applicable to this Patient - Not Applicable to this Patient    Planned Readmission: None     Discharge Statement:  I spent 35 minutes discharging the patient  This time was spent on the day of discharge  I had direct contact with the patient on the day of discharge  Greater than 50% of the total time was spent examining patient, answering all patient questions, arranging and discussing plan of care with patient as well as directly providing post-discharge instructions  Additional time then spent on discharge activities  Discharge Medications:  See after visit summary for reconciled discharge medications provided to patient and family        ** Please Note: This note has been constructed using a voice recognition system **

## 2018-01-08 NOTE — ED PROVIDER NOTES
History  Chief Complaint   Patient presents with    Seizure - New Onset     pt was at his friends house when he fell the floor and had a seizure  pt reports hitting his head when he fell  pt poor historian     A 73-year-old male with past medical history significant for ADHD; presents following a seizure  Parents state that the patient was at his friend's house when they received a phone call stating the patient had a seizure  Upon EMS arrival to the friend's house, patient was somewhat postictal and combative however quickly returned to his baseline mental status  Upon arrival to the emergency department patient was initially awake and alert  Nursing states that the patient was able to answer questions appropriately however was overall a poor historian  Patient was moving the bilateral upper and lower extremities symmetrically per nursing  Patient then underwent a grand mal seizure, which was when I was called to the room  Patient was found with tonic-clonic seizure activity, which resolved at the time Ativan was administered  Patient then remained sedated however did withdrawal to pain  Parents state the child does not have a seizure disorder  Parents report that he has not been ill lately; denying recent fever, chills, chest pain, shortness of breath, abdominal pain, nausea and vomiting  Parents deny any known drug use  Patient is adopted and complete family history is unclear  A/P:  Tonic-clonic seizure, without a history of seizures  Patient currently sedated however does withdraw to pain  Patient is afebrile with otherwise normal vitals  Will obtain lab work to evaluate for source of seizure  Will also obtain CT head to evaluate for structural abnormalities  Will check urine for infection and UDS  Will load with Keppra and closely monitor for recurrent symptoms          History provided by:  Parent  History limited by:  Mental status change      Prior to Admission Medications   Prescriptions Last Dose Informant Patient Reported? Taking? Lisdexamfetamine Dimesylate (VYVANSE PO)   Yes Yes   Sig: Take by mouth   escitalopram (LEXAPRO) 20 mg tablet   Yes No   Sig: Take 5 mg by mouth daily      Facility-Administered Medications: None       Past Medical History:   Diagnosis Date    ADHD     Anxiety        History reviewed  No pertinent surgical history  No family history on file  I have reviewed and agree with the history as documented  Social History   Substance Use Topics    Smoking status: Never Smoker    Smokeless tobacco: Never Used    Alcohol use No        Review of Systems   Unable to perform ROS: Mental status change   Neurological: Positive for seizures  All other systems reviewed and are negative        Physical Exam  ED Triage Vitals   Temperature Pulse Respirations Blood Pressure SpO2   01/07/18 2138 01/07/18 2136 01/07/18 2136 01/07/18 2136 01/07/18 2136   97 6 °F (36 4 °C) 87 18 102/69 98 %      Temp src Heart Rate Source Patient Position - Orthostatic VS BP Location FiO2 (%)   -- 01/07/18 2136 01/07/18 2136 01/07/18 2136 --    Monitor Lying Right arm       Pain Score       01/07/18 2136       No Pain           Orthostatic Vital Signs  Vitals:    01/08/18 0300 01/08/18 0345 01/08/18 0645 01/08/18 1100   BP: 107/57 108/64 106/64 109/58   Pulse: 82 68 70 70   Patient Position - Orthostatic VS:    Lying       Physical Exam   General Appearance: pt actively seizing, however once seizure activity stopped pt was sedated but did withdrawal to pain  Skin:  Warm, dry, intact; no rashes or ecchymosis  HEENT: atraumatic, normocephalic; PERRLA, EOMI  Neck: Supple, trachea midline  Cardiac: RRR, tachycardic to 110's; no murmurs, rub, gallops  Pulmonary: lungs CTAB; no wheezes, rales, rhonchi  Gastrointestinal: abdomen soft, nontender, nondistended; no guarding or rebound tenderness; good bowel sounds, no mass or bruits  Extremities:  no pedal edema, 2+ pulses; no calf tenderness, no clubbing, no cyanosis  Neuro: pt actively seizing on my initial exam, then withdrew to pain      ED Medications  Medications   LORazepam (ATIVAN) 2 mg/mL injection **AcuDose Override Pull** (not administered)   sodium chloride 0 9 % infusion (75 mL/hr Intravenous New Bag 1/8/18 0332)   ondansetron (ZOFRAN) injection 4 mg (not administered)   LORazepam (ATIVAN) 2 mg/mL injection 1 mg (1 mg Intravenous Not Given 1/8/18 4535)   levETIRAcetam (KEPPRA) tablet 750 mg (not administered)   levETIRAcetam (KEPPRA) 1,000 mg in sodium chloride 0 9 % 100 mL IVPB (0 mg Intravenous Stopped 1/7/18 4082)   gadobutrol injection (MULTI-DOSE) SOLN 5 81 mL (5 81 mL Intravenous Given 1/8/18 0849)       Diagnostic Studies  Results Reviewed     Procedure Component Value Units Date/Time    Urinalysis [[id number]]  (Normal) Collected:  01/08/18 1008    Lab Status:  Final result Specimen:  Urine from Urine, Other Updated:  01/08/18 1111     Color, UA Yellow     Clarity, UA Clear     Specific Gravity, UA 1 012     pH, UA 5 5     Leukocytes, UA Negative     Nitrite, UA Negative     Protein, UA Negative mg/dl      Glucose, UA Negative mg/dl      Ketones, UA Negative mg/dl      Urobilinogen, UA 0 2 E U /dl      Bilirubin, UA Negative     Blood, UA Negative    Rapid drug screen, urine [[id number]]  (Abnormal) Collected:  01/08/18 1008    Lab Status:  Final result Specimen:  Urine from Urine, Other Updated:  01/08/18 1029     Amph/Meth UR Positive (A)     Barbiturate Ur Negative     Benzodiazepine Urine Negative     Cocaine Urine Negative     Methadone Urine Negative     Opiate Urine Negative     PCP Ur Negative     THC Urine Negative    Narrative:         FOR MEDICAL PURPOSES ONLY  IF CONFIRMATION NEEDED PLEASE CONTACT THE LAB WITHIN 5 DAYS      Drug Screen Cutoff Levels:  AMPHETAMINE/METHAMPHETAMINES  1000 ng/mL  BARBITURATES     200 ng/mL  BENZODIAZEPINES     200 ng/mL  COCAINE      300 ng/mL  METHADONE      300 ng/mL  OPIATES      300 ng/mL  PHENCYCLIDINE     25 ng/mL  THC       50 ng/mL    Basic metabolic panel [[id number]] Collected:  01/08/18 0433    Lab Status:  Final result Specimen:  Blood from Arm, Right Updated:  01/08/18 0454     Sodium 139 mmol/L      Potassium 3 9 mmol/L      Chloride 106 mmol/L      CO2 27 mmol/L      Anion Gap 6 mmol/L      BUN 16 mg/dL      Creatinine 0 89 mg/dL      Glucose 111 mg/dL      Calcium 8 4 mg/dL      eGFR 123 ml/min/1 73sq m     Narrative:         National Kidney Disease Education Program recommendations are as follows:  GFR calculation is accurate only with a steady state creatinine  Chronic Kidney disease less than 60 ml/min/1 73 sq  meters  Kidney failure less than 15 ml/min/1 73 sq  meters      Magnesium [[id number]]  (Abnormal) Collected:  01/08/18 0433    Lab Status:  Final result Specimen:  Blood from Arm, Right Updated:  01/08/18 0454     Magnesium 2 7 (H) mg/dL     Phosphorus [[id number]]  (Abnormal) Collected:  01/08/18 0433    Lab Status:  Final result Specimen:  Blood from Arm, Right Updated:  01/08/18 0454     Phosphorus 4 7 (H) mg/dL     CBC (With Platelets) [[id number]]  (Abnormal) Collected:  01/08/18 0433    Lab Status:  Final result Specimen:  Blood from Arm, Right Updated:  01/08/18 0444     WBC 13 81 (H) Thousand/uL      RBC 4 87 Million/uL      Hemoglobin 15 0 g/dL      Hematocrit 42 3 %      MCV 87 fL      MCH 30 8 pg      MCHC 35 5 g/dL      RDW 12 6 %      Platelets 093 Thousands/uL      MPV 9 3 fL     Comprehensive metabolic panel [[id number]]  (Abnormal) Collected:  01/07/18 2230    Lab Status:  Final result Specimen:  Blood from Arm, Left Updated:  01/07/18 2318     Sodium 137 mmol/L      Potassium 3 7 mmol/L      Chloride 99 (L) mmol/L      CO2 16 (L) mmol/L      Anion Gap 22 (H) mmol/L      BUN 14 mg/dL      Creatinine 1 16 mg/dL      Glucose 128 mg/dL      Calcium 8 7 mg/dL      AST 25 U/L      ALT 27 U/L      Alkaline Phosphatase 67 U/L      Total Protein 7 9 g/dL      Albumin 4 2 g/dL      Total Bilirubin 0 33 mg/dL      eGFR 90 ml/min/1 73sq m     Narrative:         National Kidney Disease Education Program recommendations are as follows:  GFR calculation is accurate only with a steady state creatinine  Chronic Kidney disease less than 60 ml/min/1 73 sq  meters  Kidney failure less than 15 ml/min/1 73 sq  meters  Magnesium [[id number]]  (Normal) Collected:  01/07/18 2230    Lab Status:  Final result Specimen:  Blood from Arm, Left Updated:  01/07/18 2318     Magnesium 2 5 mg/dL     Phosphorus [[id number]]  (Normal) Collected:  01/07/18 2230    Lab Status:  Final result Specimen:  Blood from Arm, Left Updated:  01/07/18 2318     Phosphorus 4 3 mg/dL     CBC and differential [[id number]]  (Abnormal) Collected:  01/07/18 2230    Lab Status:  Final result Specimen:  Blood from Arm, Left Updated:  01/07/18 2246     WBC 11 10 (H) Thousand/uL      RBC 5 27 Million/uL      Hemoglobin 16 3 g/dL      Hematocrit 47 9 %      MCV 91 fL      MCH 30 9 pg      MCHC 34 0 g/dL      RDW 12 7 %      MPV 10 0 fL      Platelets 693 Thousands/uL      nRBC 0 /100 WBCs      Neutrophils Relative 40 (L) %      Lymphocytes Relative 48 (H) %      Monocytes Relative 9 %      Eosinophils Relative 3 %      Basophils Relative 0 %      Neutrophils Absolute 4 43 Thousands/µL      Lymphocytes Absolute 5 23 (H) Thousands/µL      Monocytes Absolute 1 02 Thousand/µL      Eosinophils Absolute 0 33 Thousand/µL      Basophils Absolute 0 03 Thousands/µL     POCT urinalysis dipstick [[id number]]     Lab Status:  No result Specimen:  Urine                  MRI brain seizure wo and w contrast   Final Result by Annalise Bowen MD (01/08 0005)      No acute disease  No parenchymal pathology to account for patient's symptoms  Temporal lobes are normal       Incidental 3-4 mm possible pituitary microadenoma  As deemed clinically appropriate, dedicated MR of the pituitary gland could be performed           Workstation performed: [id number] CT head without contrast   ED Interpretation by Rhianna Sauceda DO (01/08 0031)   [facility]: No CT evidence of acute intracranial abnormality      Final Result by Breann Gonsalez DO (01/08 5414)      No acute intracranial abnormality  Findings are consistent with the preliminary report from [facility] which was provided shortly after completion of the exam                       Workstation performed: [id number]               Procedures  Procedures   ECG 12 Lead Documentation  Date/Time: today/date: 1/8/2018  Performed by: Viviana Cole    ECG reviewed by me, the ED Provider: yes    Patient location:  ED   Previous ECG:  No old for comparison  Rate:  96  ECG rate assessment: normal    Rhythm: sinus rhythm    Ectopy:  none    QRS axis:  Normal  Intervals: right bundle branch block   Q waves: None   ST segments:  Normal  T waves: normal      Impression: RBBB, otherwise normal EKG          Phone Consults  ED Phone Contact    ED Course  ED Course                                MDM  CritCare Time    Disposition  Final diagnoses:   [address] mal seizure ([address] )     Time reflects when diagnosis was documented in both MDM as applicable and the Disposition within this note     Time User Action Codes Description Comment    1/8/2018 12:17 AM Kervin Tolentino Add [F61 162] [address] mal seizure ([address] )     1/8/2018  1:11 AM Dwight Beach Modify [G40 409] [address] mal seizure [facility])       ED Disposition     ED Disposition Condition Comment    Admit  Case was discussed with Dr Aron Parrish and the patient's admission status was agreed to be Observation to the service of Dr Aron Parrish  Follow-up Information    None       Patient's Medications   Discharge Prescriptions    No medications on file     No discharge procedures on file  ED Provider  Attending physically available and evaluated Zi Mckeon I managed the patient along with the ED Attending      Electronically Signed by         Rhianna Sauceda DO  Resident  01/08/18 8775

## 2018-01-08 NOTE — ED ATTENDING ATTESTATION
Bonnie Alvarenga MD, saw and evaluated the patient  I have discussed the patient with the resident/non-physician practitioner and agree with the resident's/non-physician practitioner's findings, Plan of Care, and MDM as documented in the resident's/non-physician practitioner's note, except where noted  All available labs and Radiology studies were reviewed  At this point I agree with the current assessment done in the Emergency Department  I have conducted an independent evaluation of this patient including a focused history and a physical exam       74-year-old male with a history of anxiety and ADHD on Lexapro and Vyvanse, presenting to the emergency department for evaluation of seizure  Patient was reportedly at a friend's house when he had a witnessed seizure  EMS found the patient to be postictal and slightly combative  Patient was transported to the emergency department and on nursing evaluation was GCS of 15, however after approximately 15 minutes of being in the emergency department the patient had a 2nd witnessed grand mal seizure which was treated with Ativan  Unable to obtain history from the patient secondary to him being in a postictal state  Patient's parents state that he has been in a normal state of health without any noted fevers  They do not believe that he uses  Stimulant drugs  Review of systems unable to be obtained from the patient secondary to altered mental status  Head is normocephalic atraumatic  Eyelids lashes normal   Pupils are 4 mm bilaterally reactive  The patient is unable to focus on me but is in a postictal state  Mucous membranes are moist   Neck is supple  Lungs are clear to auscultation bilaterally  Heart is regular rate and rhythm with no murmurs rubs or gallops  Abdomen is soft and nontender to palpation  Extremities are unremarkable  GCS is 12  Patient moves bilateral upper and lower extremities spontaneously    Patient withdraws to pain     29-year-old male presenting to the emergency department for evaluation of 2 episodes of seizure today  Patient will undergo evaluation with CT and lab work  Labs Reviewed   COMPREHENSIVE METABOLIC PANEL - Abnormal        Result Value Ref Range Status    Sodium 137  136 - 145 mmol/L Final    Potassium 3 7  3 5 - 5 3 mmol/L Final    Chloride 99 (*) 100 - 108 mmol/L Final    CO2 16 (*) 21 - 32 mmol/L Final    Anion Gap 22 (*) 4 - 13 mmol/L Final    BUN 14  5 - 25 mg/dL Final    Creatinine 1 16  0 60 - 1 30 mg/dL Final    Comment: Standardized to IDMS reference method    Glucose 128  65 - 140 mg/dL Final    Comment:   If the patient is fasting, the ADA then defines impaired fasting glucose as > 100 mg/dL and diabetes as > or equal to 123 mg/dL  Specimen collection should occur prior to Sulfasalazine administration due to the potential for falsely depressed results  Specimen collection should occur prior to Sulfapyridine administration due to the potential for falsely elevated results  Calcium 8 7  8 3 - 10 1 mg/dL Final    AST 25  5 - 45 U/L Final    Comment:   Specimen collection should occur prior to Sulfasalazine administration due to the potential for falsely depressed results  ALT 27  12 - 78 U/L Final    Comment:   Specimen collection should occur prior to Sulfasalazine and/or Sulfapyridine administration due to the potential for falsely depressed results  Alkaline Phosphatase 67  46 - 116 U/L Final    Total Protein 7 9  6 4 - 8 2 g/dL Final    Albumin 4 2  3 5 - 5 0 g/dL Final    Total Bilirubin 0 33  0 20 - 1 00 mg/dL Final    eGFR 90  ml/min/1 73sq m Final    Narrative:     National Kidney Disease Education Program recommendations are as follows:  GFR calculation is accurate only with a steady state creatinine  Chronic Kidney disease less than 60 ml/min/1 73 sq  meters  Kidney failure less than 15 ml/min/1 73 sq  meters     CBC AND DIFFERENTIAL - Abnormal     WBC 11 10 (*) 4 31 - 10 16 Thousand/uL Final    RBC 5 27  3 88 - 5 62 Million/uL Final    Hemoglobin 16 3  12 0 - 17 0 g/dL Final    Hematocrit 47 9  36 5 - 49 3 % Final    MCV 91  82 - 98 fL Final    MCH 30 9  26 8 - 34 3 pg Final    MCHC 34 0  31 4 - 37 4 g/dL Final    RDW 12 7  11 6 - 15 1 % Final    MPV 10 0  8 9 - 12 7 fL Final    Platelets 596  946 - 390 Thousands/uL Final    nRBC 0  /100 WBCs Final    Neutrophils Relative 40 (*) 43 - 75 % Final    Lymphocytes Relative 48 (*) 14 - 44 % Final    Monocytes Relative 9  4 - 12 % Final    Eosinophils Relative 3  0 - 6 % Final    Basophils Relative 0  0 - 1 % Final    Neutrophils Absolute 4 43  1 85 - 7 62 Thousands/µL Final    Lymphocytes Absolute 5 23 (*) 0 60 - 4 47 Thousands/µL Final    Monocytes Absolute 1 02  0 17 - 1 22 Thousand/µL Final    Eosinophils Absolute 0 33  0 00 - 0 61 Thousand/µL Final    Basophils Absolute 0 03  0 00 - 0 10 Thousands/µL Final   RAPID DRUG SCREEN, URINE - Abnormal     Amph/Meth UR Positive (*) Negative Final    Barbiturate Ur Negative  Negative Final    Benzodiazepine Urine Negative  Negative Final    Cocaine Urine Negative  Negative Final    Methadone Urine Negative  Negative Final    Opiate Urine Negative  Negative Final    PCP Ur Negative  Negative Final    THC Urine Negative  Negative Final    Narrative:     FOR MEDICAL PURPOSES ONLY  IF CONFIRMATION NEEDED PLEASE CONTACT THE LAB WITHIN 5 DAYS      Drug Screen Cutoff Levels:  AMPHETAMINE/METHAMPHETAMINES  1000 ng/mL  BARBITURATES     200 ng/mL  BENZODIAZEPINES     200 ng/mL  COCAINE      300 ng/mL  METHADONE      300 ng/mL  OPIATES      300 ng/mL  PHENCYCLIDINE     25 ng/mL  THC       50 ng/mL   MAGNESIUM - Abnormal     Magnesium 2 7 (*) 1 6 - 2 6 mg/dL Final   PHOSPHORUS - Abnormal     Phosphorus 4 7 (*) 2 7 - 4 5 mg/dL Final   CBC AND PLATELET - Abnormal     WBC 13 81 (*) 4 31 - 10 16 Thousand/uL Final    RBC 4 87  3 88 - 5 62 Million/uL Final    Hemoglobin 15 0  12 0 - 17 0 g/dL Final    Hematocrit 42 3  36 5 - 49 3 % Final    MCV 87  82 - 98 fL Final    MCH 30 8  26 8 - 34 3 pg Final    MCHC 35 5  31 4 - 37 4 g/dL Final    RDW 12 6  11 6 - 15 1 % Final    Platelets 059  059 - 390 Thousands/uL Final    MPV 9 3  8 9 - 12 7 fL Final   MAGNESIUM - Normal    Magnesium 2 5  1 6 - 2 6 mg/dL Final   PHOSPHORUS - Normal    Phosphorus 4 3  2 7 - 4 5 mg/dL Final   URINALYSIS WITH REFLEX TO MICROSCOPIC - Normal    Color, UA Yellow   Final    Clarity, UA Clear   Final    Specific [place], UA 1 012  1 003 - 1 030 Final    pH, UA 5 5  4 5 - 8 0 Final    Leukocytes, UA Negative  Negative Final    Nitrite, UA Negative  Negative Final    Protein, UA Negative  Negative mg/dl Final    Glucose, UA Negative  Negative mg/dl Final    Ketones, UA Negative  Negative mg/dl Final    Urobilinogen, UA 0 2  0 2, 1 0 E U /dl E U /dl Final    Bilirubin, UA Negative  Negative Final    Blood, UA Negative  Negative Final   BASIC METABOLIC PANEL    Sodium 080  136 - 145 mmol/L Final    Potassium 3 9  3 5 - 5 3 mmol/L Final    Chloride 106  100 - 108 mmol/L Final    CO2 27  21 - 32 mmol/L Final    Anion Gap 6  4 - 13 mmol/L Final    BUN 16  5 - 25 mg/dL Final    Creatinine 0 89  0 60 - 1 30 mg/dL Final    Comment: Standardized to IDMS reference method    Glucose 111  65 - 140 mg/dL Final    Comment:   If the patient is fasting, the ADA then defines impaired fasting glucose as > 100 mg/dL and diabetes as > or equal to 123 mg/dL  Specimen collection should occur prior to Sulfasalazine administration due to the potential for falsely depressed results  Specimen collection should occur prior to Sulfapyridine administration due to the potential for falsely elevated results      Calcium 8 4  8 3 - 10 1 mg/dL Final    eGFR 123  ml/min/1 73sq m Final    Narrative:     National Kidney Disease Education Program recommendations are as follows:  GFR calculation is accurate only with a steady state creatinine  Chronic Kidney disease less than 60 ml/min/1 73 sq  meters  Kidney failure less than 15 ml/min/1 73 sq  meters  MRI brain seizure wo and w contrast   Final Result      No acute disease  No parenchymal pathology to account for patient's symptoms  Temporal lobes are normal       Incidental 3-4 mm possible pituitary microadenoma  As deemed clinically appropriate, dedicated MR of the pituitary gland could be performed  Workstation performed: [id number]         CT head without contrast   ED Interpretation   [facility]: No CT evidence of acute intracranial abnormality      Final Result      No acute intracranial abnormality  Findings are consistent with the preliminary report from [facility] which was provided shortly after completion of the exam                       Workstation performed: [id number]                 Total critical care time involved in this patient's care equals 38 minutes including initial assessment, stabilization after seizure, repeated reexaminations, discussions of CT and lab work with family

## 2018-02-15 ENCOUNTER — OFFICE VISIT (OUTPATIENT)
Dept: NEUROLOGY | Facility: CLINIC | Age: 23
End: 2018-02-15
Payer: COMMERCIAL

## 2018-02-15 VITALS
SYSTOLIC BLOOD PRESSURE: 118 MMHG | DIASTOLIC BLOOD PRESSURE: 64 MMHG | HEIGHT: 71 IN | WEIGHT: 128.9 LBS | BODY MASS INDEX: 18.05 KG/M2 | HEART RATE: 78 BPM

## 2018-02-15 DIAGNOSIS — F90.9 ATTENTION DEFICIT HYPERACTIVITY DISORDER (ADHD), UNSPECIFIED ADHD TYPE: ICD-10-CM

## 2018-02-15 DIAGNOSIS — F41.9 ANXIETY: ICD-10-CM

## 2018-02-15 DIAGNOSIS — D35.2 PITUITARY MICROADENOMA (HCC): ICD-10-CM

## 2018-02-15 DIAGNOSIS — G40.909 UNCLASSIFIED EPILEPTIC SEIZURES (HCC): Primary | ICD-10-CM

## 2018-02-15 PROCEDURE — 99354 PR PROLONGED SVC OUTPATIENT SETTING 1ST HOUR: CPT | Performed by: PSYCHIATRY & NEUROLOGY

## 2018-02-15 PROCEDURE — 99215 OFFICE O/P EST HI 40 MIN: CPT | Performed by: PSYCHIATRY & NEUROLOGY

## 2018-02-15 RX ORDER — LEVETIRACETAM 750 MG/1
750 TABLET ORAL 2 TIMES DAILY
Qty: 180 TABLET | Refills: 1 | Status: SHIPPED | OUTPATIENT
Start: 2018-02-15 | End: 2018-05-24 | Stop reason: SDUPTHER

## 2018-02-15 NOTE — PROGRESS NOTES
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), due to the history of recurrent forced head version to the left but no clear altered awareness or preceding dyscognitive symptoms, followed by recent secondary generalized convulsion  He reports to be tolerating levetiracetam without worsening his anxiety or underlying irritability  He has co-morbid ADHD and possible autism spectrum disorder (not clearly evident on history but may be apparent on psychiatric screening tests that is not available to me), which may indicate underlying brain abnormality that is not seen on radiology studies  We discussed seizures and epilepsy diagnosis, medical management, seizure safety and seizure first aid, and driving restrictions  He cannot drive for 6 months after his last convulsive seizure  I reviewed side effects of levetiracetam, which include, but not limited to, mood swings, irritability, suicidal ideation, incoordination, cognitive impairment and GI upset, nausea, vomiting  He will inform me if there are any complications of levetiracetam, may consider transition to lamotrigine or oxcarbazepine  I do not object to current use of Vyvanse given that he has been on this medication for 10 years, and his seizures are unlikely "provoked" seizures  In regards to his pituitary microadenoma finding on the MRI brain study, he will need a dedicated MRI pituitary study; but we will wait about 6 months from the last scan  In the meantime, he will need to see an endocrinologist for blood work to assess hypothalamic-pituitary axis to determine if there is abnormal hormone output      Plan:   Problem List Items Addressed This Visit        Endocrine    Pituitary microadenoma [facility])    Relevant Orders    Ambulatory referral to Endocrinology       Nervous and Auditory    Unclassified epileptic seizures ([address] ) - Primary    Relevant Medications    levETIRAcetam (KEPPRA) 750 mg tablet    Other Relevant Orders    Levetiracetam level    EEG Sleep deprived    CBC       Other    Anxiety    ADHD          Patient Instructions   PLAN:  1 - Continue with Levetiracetam 750mg Twice a day  2 - use a pill box   3 - check levetiracetam level, CBC  4 - Sleep deprived EEG  5 - no driving for 6 months from the last seizure (bring [employer] form to the next visit)  6 - referral to Endocrinology for assessment of pituitary microadenoma  7 - will repeat MRI brain with pituitary study in 6 months from the last scan  8 - follow-up in 3 month    I discussed with the patient and family/caretaker that epileptic seizures are due to abnormal synchronous activity of the brain that can cause recurrent stereotyped neurological signs or symptoms including generalized convulsions  About 60-70% of patients with epilepsy can be controlled with the first 1-3 medications or a combination of medications  About a third of patients could be difficult to control with medications and further investigation into the diagnosis, with an inpatient epilepsy monitoring study to confirm the diagnosis of epilepsy or nonepileptic events (that can be cardiac, other neurological conditions, or psychogenic in origin)  The diagnosis is based on the risk of recurrent seizure either with 2 unprovoked seizures or a clinical event that is consistent with an epileptic seizure and an abnormal EEG with epileptiform discharges or structural lesion in the brain  he qualifies for a working diagnosis of epilepsy due to a clearly clinical seizure and suspicious recurrent paroxysmal spells that are consistent with focal seizures    I explained that we do not always know the cause of epilepsy but that other than seizures there are other symptoms that are co-morbid including mood disorders, cognitive impairment including memory and language deficits, and medication side effects that may require periodic monitoring  I discussed seizure safety and seizure first aid with the patient  Limits would be no unprotected heights (ladders, standing on chairs/tables), should not swim alone (need partners or ), cooking with a partner to avoid burn injuries, showers instead of baths  I reviewed that convulsive seizures typically last about 2 minutes with about 15-30 minutes of recovery  Should a seizure last more than 5 minutes or patient fails to recover after 30 minutes or there are multiple seizures in a day or if there is a suspected head injury then EMS should be called or they go to the nearest emergency room  If he only experiences altered awareness, confusion, or focal seizures, then they may call the office for guidance  Seizure first aid consists of preventing the patient from wandering or removal of dangerous objects or prevention of injury, for convulsive seizures, position the patient on the ground, may place a pillow under the head, turn the patient to his side, no objects or reaching for the mouth, no restraints but prevent injuries by removing glasses or sharp/heavy objects, and time the duration of the seizure  We discussed issues related to driving  I explained to the patient that the law states that all patients who have a seizure must be reported to the [employer]  Patients cannot legally drive for 6 months after seizure in the state of [place] (6 months in the state of [place] and 3 months in the [address] [place] [address] Road )  I also informed the patient that, although exceptions can be granted for patients with provoked seizures, exclusively nocturnal seizures, prolonged auras, or exclusively simple partial seizures, these exceptions are granted by the [employer] and not by the physician        Chief Complaint: hospital follow-up after a seizure   Chief Complaint     Seizures        HPI:    Coby Villa is a 23 y o  left handed male here for follow-up evaluation of new onset seizures  He was previously evaluated by [facility]  [facility]  The following is from interviewing the patient and review of the available office/hospital notes  Intake History 2/15/2018  Patient's history:  He recalled his first seizure (age 23years old) on 1/7/2018; he noticed his head was turning to left and twitching, then he was shaking and fell down, (heard his friend called out "Oh my God")and unable to move the way he wanted to move, then he lost consciousness  About 30 minutes before he had an episode of forced head movement to the left  He denies a warning, feeling out of sorts, or jenny vu sensation  He had a second seizure about 90 minutes later, his parents witness that he had uncontrollable forced head movement to the left (he would say I'm okay) just before he had his second convulsion  He has had recurrent episodes of involuntary head turning to the left in the last 6-12 months, for a few seconds, there is no confusion or sensation of feeling spacey  Just before it happens he may have a moment of       Disorientation (but he is not sure if this is the correct sensation)  He has been on Keppra for the past month  He denies recurrent episodes of force head version to the left  He has a history of ADHD and on the Asperger's with mood swings and anxiety  There is no worsening of his anxiety  His parents took away his phone because he is obsessed with social media, YouTube, and games  He describes episodes of lightheadedness when he gets up to quickly from a seated or lying down position, feels that he is momentarily off balance  The patient denies any history of myoclonus, staring spells, automatisms, unexplained hyperkinetic behaviors, olfactory / gustatory hallucinations, epigastric rising events, jenny vu events, visual hallucinations, unexplained nocturnal enuresis, or nocturnal tongue biting      AED/side effects/compliance:  Levetiracetam 750mg twice a day  He manages his own medication  Parents are watching for missed doses  One missed dose in the past month    Event/Seizure semiology:  1  Sporadic force head version to the left  (no facial twitching or involvement of the left arm)  2  Recurrent force head version to the left (tries to force his head back midline and say that he is okay), then secondary generalization  Prior Epilepsy History:  From [facility] during hospitalization 2018  Mr May Reid is an adopted [facility] male who had presented to hospital on 2018 with new onset seizure after playing video games  He was seen by Dr Herman Walden from the neurology consult service  He reported having a semi-familiar feeling of his mind not being there, turned his head to the left, then he had a generalized convulsion  He woke up with EMT around him, feeling confused, slurring his speech  He had a second seizure while arriving at the ED  He reported that he had been having recurrent feelings of cannot control his mind and turned to the left  These events have become more frequent, about once every few weeks  He was subsequently started on Keppra 750mg twice a day  Special Features  Status epilepticus: No  Self Injury Seizures: No  Precipitating Factors: None    Epilepsy Risk Factors:  Unknown maternal history because he is adopted from [facility]  Abnormal birth/: born around 37 week, hyperbilirubinemia with light therapy  Abnormal Development: he was adopted around 9 months, maybe a late walker but there were more falls    He needed physical therapy for fine motor skills  Febrile seizures, simple: No  Febrile seizures, complex: No  CNS infection: No  Mental retardation: No  Cerebral palsy: No  Head injury (moderate/severe): No  CNS neoplasm: No  CNS malformation: No  Neurosurgical procedure: No  Stroke: No  Alcohol abuse: No  Drug abuse: No  Family history Sz/epilepsy: Unknown    Prior AEDs:  medication Max dose Time used Reason to stop   levetiracetam              Prior workup:  I have personally reviewed the studies during this office visit  [id number]   MRI brain  No acute pathology; temporal lobes are normal, symmetric hippocampi  Incidental 3-4 mm possible pituitary microadenoma    EEGs:  2018  Normal awake and asleep EEG  Labs:  Component      Latest Ref Rng & Units 2018   WBC      4 31 - 10 16 Thousand/uL 11 10 (H) 13 81 (H)   RBC      3 88 - 5 62 Million/uL 5 27 4 87   Hemoglobin      12 0 - 17 0 g/dL 16 3 15 0   Hematocrit      36 5 - 49 3 % 47 9 42 3   Platelets      240 - 390 Thousands/uL 309 270   Sodium      136 - 145 mmol/L 137    Potassium      3 5 - 5 3 mmol/L 3 7    Chloride      100 - 108 mmol/L 99 (L)    CO2      21 - 32 mmol/L 16 (L)    Anion Gap      4 - 13 mmol/L 22 (H)    BUN      5 - 25 mg/dL 14    Creatinine      0 60 - 1 30 mg/dL 1 16    Glucose      65 - 140 mg/dL 128    Calcium      8 3 - 10 1 mg/dL 8 7    AST      5 - 45 U/L 25    ALT      12 - 78 U/L 27    Alkaline Phosphatase      46 - 116 U/L 67    Total Protein      6 4 - 8 2 g/dL 7 9    Albumin      3 5 - 5 0 g/dL 4 2    Total Bilirubin      0 20 - 1 00 mg/dL 0 33    eGFR      ml/min/1 73sq m 90        Past Medical/Surgical History:  Past Medical History:   Diagnosis Date    ADHD     Anxiety     Asperger syndrome      History reviewed  No pertinent surgical history      Past Psychiatric History:  Depression: No  Anxiety: Yes, ADHD  Psychosis: No    Medications:    Current Outpatient Prescriptions:     escitalopram (LEXAPRO) 20 mg tablet, Take 5 mg by mouth daily, Disp: , Rfl:     levETIRAcetam (KEPPRA) 750 mg tablet, Take 1 tablet (750 mg total) by mouth 2 (two) times a day, Disp: 180 tablet, Rfl: 1    Lisdexamfetamine Dimesylate (VYVANSE PO), Take by mouth, Disp: , Rfl:     Allergies:  No Known Allergies    Family history:  Family History   Problem Relation Age of Onset    Adopted: Yes    Family history unknown: Yes       Social History  Living situation:  Lives with adopted parents  Tobacco:  No tobacco use   Alcohol:  No alcohol use  Drugs:  No illegal drug use  Work: He went to college for 1 year and struggled with attendance  Driving:  No  He is very extroverted  He sees a psychiatrist for ADHD, his prior psychiatrist (Dr Kimberley Kennedy thought that he was on the Spectrum)    General exam   Vitals:    02/15/18 0957   BP: 118/64   Pulse: 78     GENERAL:  normally developed person in no acute distress, atraumatic head  Eyes: Anicteric  Neck: Supple  Carotids: No bruits  Heart: Regular rate and rhythm  Chest: clear to auscultation  Extremities: no edema, warm to touch, normal pulses    MENTAL STATUS  Orientation: Alert and oriented x 3  Fund of knowledge: Intact  Attention/concentration: Able to spell HOUSE forward and backwards  Recent/remote memory: Recalls one out of three objects at five minutes  Language: Intact naming, repetition and comprehension    OPHTHALMOSCOPIC  Fundus/Optic discs/Posterior segments: normal    CRANIAL NERVES  II: PERRL  Visual fields full  III, IV, VI: Extraocular movements intact  No nystagmus  V: Facial sensation normal V1-V3  VII: Facial movements normal and symmetric  VIII: Intact hearing bilaterally  IX, X: Palate elevation normal and symmetric  XI: Intact trapezius, SCM strength  XII: Tongue protrudes to the midline    MOTOR (Upper and lower extremities)   Bulk/tone/abnormal movement: Normal muscle bulk and tone  Drift: No pronator drift  Strength: Strength 5/5 throughout  COORDINATION   F/N: normal  FFM: normal  SUZI: normal  Station/Gait: Normal  gait  Normal tandem gait  SENSORY  Vibration: normal  Light touch: normal  Proprioception: normal  Romberg sign absent      Reflexes:  deep tendon reflexes are 2+/4 throughout, flexor response bilaterally    Review of Systems    A review of at least 12 organ/systems was obtained by the medical assistant and reviewed by me, including additional positives/negatives:  Pertinent items are noted in HPI  All other 12 systems are negative, except for     Review of Systems   All other systems reviewed and are negative  Decision making was of high-complexity due to the patient's high risk condition (seizures), psychiatric and neuropsychological comorbidities, behavioral problems, memory and cognitive problems and medication side effects  Prolonged service  I spent an additional 35 minutes to address issues of management of epilepsy/loss of consciousness, psychiatric comorbidities, medication instructions, and monitoring (adverse effects, side effects, and risk of antiepileptic medications)    Started at [address] [facility] at 11:35AM

## 2018-02-15 NOTE — PATIENT INSTRUCTIONS
PLAN:  1 - Continue with Levetiracetam 750mg Twice a day  2 - use a pill box   3 - check levetiracetam level, CBC  4 - Sleep deprived EEG  5 - no driving for 6 months from the last seizure (bring [employer] form to the next visit)  6 - referral to Endocrinology for assessment of pituitary microadenoma  7 - will repeat MRI brain with pituitary study in 6 months from the last scan  8 - follow-up in 3 month    I discussed with the patient and family/caretaker that epileptic seizures are due to abnormal synchronous activity of the brain that can cause recurrent stereotyped neurological signs or symptoms including generalized convulsions  About 60-70% of patients with epilepsy can be controlled with the first 1-3 medications or a combination of medications  About a third of patients could be difficult to control with medications and further investigation into the diagnosis, with an inpatient epilepsy monitoring study to confirm the diagnosis of epilepsy or nonepileptic events (that can be cardiac, other neurological conditions, or psychogenic in origin)  The diagnosis is based on the risk of recurrent seizure either with 2 unprovoked seizures or a clinical event that is consistent with an epileptic seizure and an abnormal EEG with epileptiform discharges or structural lesion in the brain  he qualifies for a working diagnosis of epilepsy due to a clearly clinical seizure and suspicious recurrent paroxysmal spells that are consistent with focal seizures  I explained that we do not always know the cause of epilepsy but that other than seizures there are other symptoms that are co-morbid including mood disorders, cognitive impairment including memory and language deficits, and medication side effects that may require periodic monitoring  I discussed seizure safety and seizure first aid with the patient    Limits would be no unprotected heights (ladders, standing on chairs/tables), should not swim alone (need partners or ), cooking with a partner to avoid burn injuries, showers instead of baths  I reviewed that convulsive seizures typically last about 2 minutes with about 15-30 minutes of recovery  Should a seizure last more than 5 minutes or patient fails to recover after 30 minutes or there are multiple seizures in a day or if there is a suspected head injury then EMS should be called or they go to the nearest emergency room  If he only experiences altered awareness, confusion, or focal seizures, then they may call the office for guidance  Seizure first aid consists of preventing the patient from wandering or removal of dangerous objects or prevention of injury, for convulsive seizures, position the patient on the ground, may place a pillow under the head, turn the patient to his side, no objects or reaching for the mouth, no restraints but prevent injuries by removing glasses or sharp/heavy objects, and time the duration of the seizure  We discussed issues related to driving  I explained to the patient that the law states that all patients who have a seizure must be reported to the [employer]  Patients cannot legally drive for 6 months after seizure in the [facility] (6 months in the state [facility] and 3 months in the [address] [place] [address] Road )  I also informed the patient that, although exceptions can be granted for patients with provoked seizures, exclusively nocturnal seizures, prolonged auras, or exclusively simple partial seizures, these exceptions are granted by the [employer] and not by the physician  SEIZURE SAFETY    Dont let fear of seizures keep you at home  Be smart about your activities to make sure you are safe  The guidelines below can help you be as safe as possible      Make sure the people around you are aware of:   What happens when you have a seizure   Correct seizure first aid   First aid for choking (consider taking a basic life support class)   When they should know to call 911 or for medical help    Avoid common triggers of seizures:   Missing your medications   Not getting enough sleep   Drinking alcohol   Using illegal drugs    Safety measures for at home:  In the bathroom:    Do not take baths in the bathtub  Instead, take only showers  Try to have a family member available while you are in the shower   Make sure the drain in the bathtub/shower is working properly to avoid pooling of water   You can consider a fitted shower seat  Recessed soap trays can minimize injury if you would happen to fall in the shower   Bathroom doors can be hung to open outwards, so that the door can still be opened if you fall against the door   Do not lock the bathroom door  Use an Occupied sign on the door or other signal to let others know you are in the bathroom  o Safety locks can be obtained from the [facility]   On your water heater, set your water temperature to a warm temperature that is not scalding to avoid burns from very hot water   Put non-skid strips/ in the bathtub   Use an electric shaver   Avoid any electrical appliances (including electric shaver) in the bathroom or near water   Use shatterproof glass for mirrors  In the kitchen:    When possible, cook using a microwave   Only cook or use electrical appliances when someone else is in the house and available   As much as possible, grill food and avoid fryers or frying food   Use the back burners of the stove and turn the pot handles toward the back of the stove   Avoid carrying hot pans, pots of boiling water, or very hot food  Serve food or liquids directly from the stove  At the least, minimize the distance hot food is carried   Use precut foods or food processers to limit the need to use knives   Use plastic or durable cups, dishes, and containers instead of breakable glass items      In the bedroom   Low level and wide beds (like a futon) can reduce risk of injury of falling out of bed  If there is a high risk of falling out of bed, you can consider simply putting the mattress on the floor   Avoid sleeping on top bunks of bunk beds   Place a soft carpet on the floor  Around the house   Pad sharp corners of tables, chairs, etc  Round tables and furniture can be considered to avoid sharp corners   Avoid open flames  Place a screen in front of fireplaces and dont build a fire alone   Allow for open spaces with furniture   Avoid loose throw rugs or slippery floors  Non-slip анна or cushioned анна can help reduce injury from a fall   Fireproof fabrics and furniture are best, and especially important if you smoke   Doors and windows with safety glass are safer if someone falls against them   Avoid lights and heaters that could easily be knocked over   Use curling irons or clothing irons that have automatic shut off switches   Make sure motor driven equipment or lawn mowers have dead mans handles or seats so they will turn off if you release pressure  Safety measures when away from home  [address] law mandates that you cannot drive for 6 months after your most recent seizure   [place] law mandates that you cannot drive for 6 months after your most recent seizure  Work/Travel   Wear a medical alert bracelet or necklace at all times   Wear a helmet and use protective clothing/equipment when appropriate   Consider telling co-workers and travel companions that you have epilepsy and what to do if you have a seizure   Avoid irregular shifts or disruptions of a regular sleep pattern   Take your medications on time and keep an updated list of medications in your purse or wallet   Do not climb to heights or operate heavy machinery   Stand back from the edges of roads or bus/train platforms when traveling   If you wander when you have a seizure, take a friend along for the trip      Ma-papeterieation  Rithmio can be dangerous  Do not swim alone, in open water, or in murky water that you cannot see the bottom  o Caregivers should be with you in the pool at all times and must be physically able to get you out of the water   Use a flotation device   Scuba diving is not recommended since during a seizure people are unaware of their surroundings  o Having a seizure underwater can be deadly and can endanger the lives of others   Kayaking and canoeing can be especially dangerous  o People with epilepsy are at a higher risk of becoming trapped underneath a canoe or kayak   Wear a helmet when playing contact sports, biking, or if there is a risk of falling  o Patients with epilepsy are at a higher risk of head injury when playing contact sports   Avoid riding a bike, running, or other activities around busy roads, steep hills, or secluded areas   Exercise on soft surfaces   Theme [place]: many people with epilepsy can go on rides depending on their type of seizures  o For some people, stress or excitement can trigger a seizure  o Rollercoasters (especially if you are upside-down) are more dangerous for people with epilepsy  Medications   Take your medications on time  If you have trouble remembering, set alarms on your phone  You can visit www  kxbrnot0qyipatq  com to set up reminders through text message to help you remember to take your medications   Use a pillbox to help you keep track of your medications   When out of the house, take any needed medications with you  Consider keeping one or two extra doses with you in case you are unexpectedly away from home longer than planned   If you realize you missed a dose of your medications and it is less than 2 hours until your next dose, skip the missed dose  Do not double up your medication dose  If it is more than 2 hours until your next dose, you can take the missed dose      Avoid drinking alcohol, since this can enhance effects of your seizure medications   Be aware of common and major side effects of your medications   Keep your medications out of the reach of children  Parenting:  [place] your home as much as possible   It is possible that you could drop your baby if you have a seizure while holding or feeding them   If you are nursing a baby, sit on the floor or bed with your back supported so the baby will not fall far if you should lose consciousness   Feed the baby while he or she is seated in an infant seat   Dress, change, and sponge bathe the baby on the floor   Move the baby around in a stroller or small crib   Keep a young baby in a playpen when you are alone, and a toddler in an indoor play yard, or childproof one room and use safety dumont at the doors   When out of the house, use a bungee-type cord or restraint harness so your child cannot wander away if you have a seizure that affects your awareness  What is a seizure and what is epilepsy? What is a seizure?  A seizure is a sudden repetitive electrical surge ofin the brain (an electrical storm of the brain)   During a seizure, the electrical storm in the brain can cause different people to do different things depending on what part of the brain and how much of the brain is affected    o For example: some people may have just an odd sensation, some just stare, or others may become unresponsive, fall, and shake all over   Seizures are not a disease by themselves, but are the symptom of other disorders that can affect the brain  What is epilepsy?  Epilepsy is a condition where people have recurrent unprovoked seizures  What is difference between seizures and epilepsy?  A seizure is a single event, which can be provoked by certain medical conditions or occur as a symptom of epilepsy  Epilepsy is a chronic condition where the brain has a tendency to have recurrent seizures  What happens during a seizure?    There are many different kinds of seizures:  o Simple partial seizures: Isolated twitching, numbness, sweating, dizziness, nausea/vomiting, disturbances to hearing, vision, smell or taste  No loss of consciousness occurs, and the person remains aware of his/her environment  o Complex partial seizures: Staring, motionless, picking at clothes, smacking lips, swallowing repeatedly or wandering around  The person is not aware of their surroundings and is not fully responsive   o Atonic seizures: Drop attacks or sudden, rapid fall to ground with rapid recovery  o Myoclonic seizures: Brief forceful jerks which can affect the whole body or just part of it    o Absence seizures: May appear to be daydreaming or spacing out   The person is momentarily unresponsive and unaware of what is happening around him/her  o Tonic seizures: Stiffening of part or of the entire body  o Generalized Tonic-Clonic Seizures  Grand-mal seizure   Sudden loss of consciousness with body stiffening followed by continuous jerking movements  A blue tinge around the mouth is likely but lack of oxygen is rare  Loss of bladder and/or bowel control may occur  What causes epilepsy?  In about seven out of every ten patients with epilepsy, no cause can be found   Among the rest, there are many different causes  Some people have a genetic/ inherited cause or are born with a malformation of the brain  Other people may develop epilepsy from an injury to the brain such as infection (meningitis/encephalitis), traumatic brain injury, stroke, or brain tumor  Who gets epilepsy?  Epilepsy is the fourth most common neurologic disorder   Epilepsy can develop at any time of life, but most commonly develops in childhood or in later life   About 4 % of people will have a seizure at some point of their life and about 1% of people have epilepsy  How is epilepsy diagnosed?  Epilepsy is a spectrum of disorders (many types and causes of epilepsy)   Your doctor will determine if you have epilepsy or if you are at a high risk of having another seizure based on the type of seizures/events, medical history, physical examination, and ancillary tests (brain imaging, EEG, and blood tests)   Some medical conditions can cause spells that look very similar to an epileptic seizure, but are not actually caused by an electrical storm of the brain  For this reason, sometimes your doctor may recommend admission to the hospital to help make a diagnosis  What tests can I expect to be performed?  A complete history and neurologic examination; if a witness is available, please have the witness communicate with your doctor to describe the event/seizure,   An electroencephalogram (EEG) test, which measures the electrical signals of the brain   Magnetic resonance imaging (MRI) of your brain   Blood tests   Other tests may be needed depending on your epilepsy type or your response to treatment    How is epilepsy treated?  Most people with epilepsy are treated with medications  There are many different kinds of medications used to treat epilepsy and each medication may work differently for each individual person   In some people whose seizures do not stop with medications alone, other treatments like surgery, special diets, or implanted devices can be used  What are the goals of epilepsy treatment?  With all patients, the goal is to be seizure free without any medication side effects to medications  Even if this this goal hasnt been met, we always continue to work toward getting rid of all seizuresseizure freedom   We always work to help people live full and happy lives  Will I always have seizures?  About 6 out of 10 people will become seizure free in the first few years of treatment   About 2-3 out of 10 people will have uncontrolled epilepsy  o With continued work, these people can have improvements in their seizure frequency and quality of life      Will I always need to take medications?  This is a very complex question and depends on your personal situation  Your doctor will be able to help guide you   o For example: some children outgrow their epilepsy as the brain develops, but other kinds of epilepsy can be life-long   Most patients stay on medications or at least 2 or more years before looking into the possibility of coming off medications   Some tests (repeat EEGs or MRI scans) may be considered to help determine if patients are at a higher risk of having additional seizures   A persons living situation needs to be considered  Issues like work, home life, driving, or family planning play a big role in determining if coming off of medications is right for that person   If a trial of coming off medications is wanted and felt to be appropriate, medications are typically slowly decreased  o This allows patients and doctors to detect problems or seizures when they are small and less likely to cause other problems  o     For more information about seizures and epilepsy, you can visit the web pages for: [address]: www docplanner  com

## 2018-02-15 NOTE — PROGRESS NOTES
Review of Systems    A review of at least 12 organ/systems was obtained by the medical assistant and reviewed by me, including additional positives/negatives:  Pertinent items are noted in HPI  All other 12 systems are negative, except for      Review of Systems   All other systems reviewed and are negative

## 2018-02-15 NOTE — LETTER
February 16, 2018     Francois Sunshine MD  [address]  [address] [place] [id number]    Patient: Tod Linda   YOB: 1995   Date of Visit: 2/15/2018       Dear Dr Maikol Palacios:    Thank you for referring Tod Linda to me for evaluation  Below are my notes for this consultation  If you have questions, please do not hesitate to call me  I look forward to following your patient along with you  Sincerely,        Rich Cross MD        CC: No Recipients  Rich Cross MD  2/16/2018 10:32 AM  Sign at close encounter  Visit Type: hospital follow-up  Referring MD / PCP:  Francois Sunshine MD    Assessment:  Mr Tod Linda is a 23 y o  male with 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), due to the history of recurrent forced head version to the left but no clear altered awareness or preceding dyscognitive symptoms, followed by recent secondary generalized convulsion  He reports to be tolerating levetiracetam without worsening his anxiety or underlying irritability  He has co-morbid ADHD and possible autism spectrum disorder (not clearly evident on history but may be apparent on psychiatric screening tests that is not available to me), which may indicate underlying brain abnormality that is not seen on radiology studies  We discussed seizures and epilepsy diagnosis, medical management, seizure safety and seizure first aid, and driving restrictions  He cannot drive for 6 months after his last convulsive seizure  I reviewed side effects of levetiracetam, which include, but not limited to, mood swings, irritability, suicidal ideation, incoordination, cognitive impairment and GI upset, nausea, vomiting  He will inform me if there are any complications of levetiracetam, may consider transition to lamotrigine or oxcarbazepine      I do not object to current use of Vyvanse given that he has been on this medication for 10 years, and his seizures are unlikely "provoked" seizures  In regards to his pituitary microadenoma finding on the MRI brain study, he will need a dedicated MRI pituitary study; but we will wait about 6 months from the last scan  In the meantime, he will need to see an endocrinologist for blood work to assess hypothalamic-pituitary axis to determine if there is abnormal hormone output  Plan:   Problem List Items Addressed This Visit        Endocrine    Pituitary microadenoma [facility])    Relevant Orders    Ambulatory referral to Endocrinology       Nervous and Auditory    Unclassified epileptic seizures ([address] ) - Primary    Relevant Medications    levETIRAcetam (KEPPRA) 750 mg tablet    Other Relevant Orders    Levetiracetam level    EEG Sleep deprived    CBC       Other    Anxiety    ADHD          Patient Instructions   PLAN:  1 - Continue with Levetiracetam 750mg Twice a day  2 - use a pill box   3 - check levetiracetam level, CBC  4 - Sleep deprived EEG  5 - no driving for 6 months from the last seizure (bring [employer] form to the next visit)  6 - referral to Endocrinology for assessment of pituitary microadenoma  7 - will repeat MRI brain with pituitary study in 6 months from the last scan  8 - follow-up in 3 month    I discussed with the patient and family/caretaker that epileptic seizures are due to abnormal synchronous activity of the brain that can cause recurrent stereotyped neurological signs or symptoms including generalized convulsions  About 60-70% of patients with epilepsy can be controlled with the first 1-3 medications or a combination of medications  About a third of patients could be difficult to control with medications and further investigation into the diagnosis, with an inpatient epilepsy monitoring study to confirm the diagnosis of epilepsy or nonepileptic events (that can be cardiac, other neurological conditions, or psychogenic in origin)      The diagnosis is based on the risk of recurrent seizure either with 2 unprovoked seizures or a clinical event that is consistent with an epileptic seizure and an abnormal EEG with epileptiform discharges or structural lesion in the brain  he qualifies for a working diagnosis of epilepsy due to a clearly clinical seizure and suspicious recurrent paroxysmal spells that are consistent with focal seizures  I explained that we do not always know the cause of epilepsy but that other than seizures there are other symptoms that are co-morbid including mood disorders, cognitive impairment including memory and language deficits, and medication side effects that may require periodic monitoring  I discussed seizure safety and seizure first aid with the patient  Limits would be no unprotected heights (ladders, standing on chairs/tables), should not swim alone (need partners or ), cooking with a partner to avoid burn injuries, showers instead of baths  I reviewed that convulsive seizures typically last about 2 minutes with about 15-30 minutes of recovery  Should a seizure last more than 5 minutes or patient fails to recover after 30 minutes or there are multiple seizures in a day or if there is a suspected head injury then EMS should be called or they go to the nearest emergency room  If he only experiences altered awareness, confusion, or focal seizures, then they may call the office for guidance  Seizure first aid consists of preventing the patient from wandering or removal of dangerous objects or prevention of injury, for convulsive seizures, position the patient on the ground, may place a pillow under the head, turn the patient to his side, no objects or reaching for the mouth, no restraints but prevent injuries by removing glasses or sharp/heavy objects, and time the duration of the seizure  We discussed issues related to driving  I explained to the patient that the law states that all patients who have a seizure must be reported to the [facility]/[employer]    Patients cannot legally drive for 6 months after seizure in the state of [place] (6 months in the state [facility] and 3 months in the [address] [place] [address] )  I also informed the patient that, although exceptions can be granted for patients with provoked seizures, exclusively nocturnal seizures, prolonged auras, or exclusively simple partial seizures, these exceptions are granted by the DMV/[place] and not by the physician  Chief Complaint: hospital follow-up after a seizure   Chief Complaint     Seizures        HPI:    Peter Miller is a 23 y o  left handed male here for follow-up evaluation of new onset seizures  He was previously evaluated by [facility]  The following is from interviewing the patient and review of the available office/hospital notes  Intake History 2/15/2018  Patient's history:  He recalled his first seizure (age 23years old) on 1/7/2018; he noticed his head was turning to left and twitching, then he was shaking and fell down, (heard his friend called out "Oh my God")and unable to move the way he wanted to move, then he lost consciousness  About 30 minutes before he had an episode of forced head movement to the left  He denies a warning, feeling out of sorts, or jenny vu sensation  He had a second seizure about 90 minutes later, his parents witness that he had uncontrollable forced head movement to the left (he would say I'm okay) just before he had his second convulsion  He has had recurrent episodes of involuntary head turning to the left in the last 6-12 months, for a few seconds, there is no confusion or sensation of feeling spacey  Just before it happens he may have a moment of       Disorientation (but he is not sure if this is the correct sensation)  He has been on Keppra for the past month  He denies recurrent episodes of force head version to the left  He has a history of ADHD and on the Asperger's with mood swings and anxiety  There is no worsening of his anxiety    His parents took away his phone because he is obsessed with social media, [employer], and games  He describes episodes of lightheadedness when he gets up to quickly from a seated or lying down position, feels that he is momentarily off balance  The patient denies any history of myoclonus, staring spells, automatisms, unexplained hyperkinetic behaviors, olfactory / gustatory hallucinations, epigastric rising events, jenny vu events, visual hallucinations, unexplained nocturnal enuresis, or nocturnal tongue biting  AED/side effects/compliance:  Levetiracetam 750mg twice a day  He manages his own medication  Parents are watching for missed doses  One missed dose in the past month    Event/Seizure semiology:  1  Sporadic force head version to the left  (no facial twitching or involvement of the left arm)  2  Recurrent force head version to the left (tries to force his head back midline and say that he is okay), then secondary generalization  Prior Epilepsy History:  From [facility] during hospitalization 2018  Mr Phil Santos is an adopted [facility] male who had presented to hospital on 2018 with new onset seizure after playing video games  He was seen by Dr Heidy Page from the neurology consult service  He reported having a semi-familiar feeling of his mind not being there, turned his head to the left, then he had a generalized convulsion  He woke up with EMT around him, feeling confused, slurring his speech  He had a second seizure while arriving at the ED  He reported that he had been having recurrent feelings of cannot control his mind and turned to the left  These events have become more frequent, about once every few weeks  He was subsequently started on Keppra 750mg twice a day      Special Features  Status epilepticus: No  Self Injury Seizures: No  Precipitating Factors: None    Epilepsy Risk Factors:  Unknown maternal history because he is adopted from [facility]  Abnormal birth/: born around 37 week, hyperbilirubinemia with light therapy  Abnormal Development: he was adopted around 9 months, maybe a late walker but there were more falls  He needed physical therapy for fine motor skills  Febrile seizures, simple: No  Febrile seizures, complex: No  CNS infection: No  Mental retardation: No  Cerebral palsy: No  Head injury (moderate/severe): No  CNS neoplasm: No  CNS malformation: No  Neurosurgical procedure: No  Stroke: No  Alcohol abuse: No  Drug abuse: No  Family history Sz/epilepsy: Unknown    Prior AEDs:  medication Max dose Time used Reason to stop   levetiracetam              Prior workup:  I have personally reviewed the studies during this office visit  [id number]   MRI brain  No acute pathology; temporal lobes are normal, symmetric hippocampi  Incidental 3-4 mm possible pituitary microadenoma    EEGs:  2018  Normal awake and asleep EEG  Labs:  Component      Latest Ref Rng & Units 2018   WBC      4 31 - 10 16 Thousand/uL 11 10 (H) 13 81 (H)   RBC      3 88 - 5 62 Million/uL 5 27 4 87   Hemoglobin      12 0 - 17 0 g/dL 16 3 15 0   Hematocrit      36 5 - 49 3 % 47 9 42 3   Platelets      174 - 390 Thousands/uL 309 270   Sodium      136 - 145 mmol/L 137    Potassium      3 5 - 5 3 mmol/L 3 7    Chloride      100 - 108 mmol/L 99 (L)    CO2      21 - 32 mmol/L 16 (L)    Anion Gap      4 - 13 mmol/L 22 (H)    BUN      5 - 25 mg/dL 14    Creatinine      0 60 - 1 30 mg/dL 1 16    Glucose      65 - 140 mg/dL 128    Calcium      8 3 - 10 1 mg/dL 8 7    AST      5 - 45 U/L 25    ALT      12 - 78 U/L 27    Alkaline Phosphatase      46 - 116 U/L 67    Total Protein      6 4 - 8 2 g/dL 7 9    Albumin      3 5 - 5 0 g/dL 4 2    Total Bilirubin      0 20 - 1 00 mg/dL 0 33    eGFR      ml/min/1 73sq m 90        Past Medical/Surgical History:  Past Medical History:   Diagnosis Date    ADHD     Anxiety     Asperger syndrome      History reviewed   No pertinent surgical history  Past Psychiatric History:  Depression: No  Anxiety: Yes, ADHD  Psychosis: No    Medications:    Current Outpatient Prescriptions:     escitalopram (LEXAPRO) 20 mg tablet, Take 5 mg by mouth daily, Disp: , Rfl:     levETIRAcetam (KEPPRA) 750 mg tablet, Take 1 tablet (750 mg total) by mouth 2 (two) times a day, Disp: 180 tablet, Rfl: 1    Lisdexamfetamine Dimesylate (VYVANSE PO), Take by mouth, Disp: , Rfl:     Allergies:  No Known Allergies    Family history:  Family History   Problem Relation Age of Onset    Adopted: Yes    Family history unknown: Yes       Social History  Living situation:  Lives with adopted parents  Tobacco:  No tobacco use   Alcohol:  No alcohol use  Drugs:  No illegal drug use  Work: He went to college for 1 year and struggled with attendance  Driving:  No  He is very extroverted  He sees a psychiatrist for ADHD, his prior psychiatrist (Dr Fatimah Holloway thought that he was on the Spectrum)    General exam   Vitals:    02/15/18 0957   BP: 118/64   Pulse: 78     GENERAL:  normally developed person in no acute distress, atraumatic head  Eyes: Anicteric  Neck: Supple  Carotids: No bruits  Heart: Regular rate and rhythm  Chest: clear to auscultation  Extremities: no edema, warm to touch, normal pulses    MENTAL STATUS  Orientation: Alert and oriented x 3  Fund of knowledge: Intact  Attention/concentration: Able to spell HOUSE forward and backwards  Recent/remote memory: Recalls one out of three objects at five minutes  Language: Intact naming, repetition and comprehension    OPHTHALMOSCOPIC  Fundus/Optic discs/Posterior segments: normal    CRANIAL NERVES  II: PERRL  Visual fields full  III, IV, VI: Extraocular movements intact  No nystagmus    V: Facial sensation normal V1-V3  VII: Facial movements normal and symmetric  VIII: Intact hearing bilaterally  IX, X: Palate elevation normal and symmetric  XI: Intact trapezius, SCM strength  XII: Tongue protrudes to the midline    MOTOR (Upper and lower extremities)   Bulk/tone/abnormal movement: Normal muscle bulk and tone  Drift: No pronator drift  Strength: Strength 5/5 throughout  COORDINATION   F/N: normal  FFM: normal  SUZI: normal  Station/Gait: Normal  gait  Normal tandem gait  SENSORY  Vibration: normal  Light touch: normal  Proprioception: normal  Romberg sign absent  Reflexes:  deep tendon reflexes are 2+/4 throughout, flexor response bilaterally    Review of Systems    A review of at least 12 organ/systems was obtained by the medical assistant and reviewed by me, including additional positives/negatives:  Pertinent items are noted in HPI  All other 12 systems are negative, except for     Review of Systems   All other systems reviewed and are negative  Decision making was of high-complexity due to the patient's high risk condition (seizures), psychiatric and neuropsychological comorbidities, behavioral problems, memory and cognitive problems and medication side effects  Prolonged service  I spent an additional 35 minutes to address issues of management of epilepsy/loss of consciousness, psychiatric comorbidities, medication instructions, and monitoring (adverse effects, side effects, and risk of antiepileptic medications)    Started at [address] [facility] at 11:35AM

## 2018-03-01 ENCOUNTER — HOSPITAL ENCOUNTER (OUTPATIENT)
Dept: NEUROLOGY | Facility: AMBULATORY SURGERY CENTER | Age: 23
Discharge: HOME | End: 2018-03-01
Payer: COMMERCIAL

## 2018-03-01 DIAGNOSIS — G40.909 UNCLASSIFIED EPILEPTIC SEIZURES (HCC): ICD-10-CM

## 2018-03-01 PROCEDURE — 95819 EEG AWAKE AND ASLEEP: CPT | Performed by: PSYCHIATRY & NEUROLOGY

## 2018-03-01 PROCEDURE — 95819 EEG AWAKE AND ASLEEP: CPT

## 2018-03-02 ENCOUNTER — TELEPHONE (OUTPATIENT)
Dept: NEUROLOGY | Facility: CLINIC | Age: 23
End: 2018-03-02

## 2018-03-02 NOTE — TELEPHONE ENCOUNTER
----- Message from Blake Lennon MD sent at 3/1/2018  5:16 PM EST -----  This is a normal awake and drowsy EEG  This does not rule out epilepsy and the patient will need to stay on Keppra  Will hold off further testing unless there are more seizures

## 2018-05-22 ENCOUNTER — TELEPHONE (OUTPATIENT)
Dept: NEUROLOGY | Facility: CLINIC | Age: 23
End: 2018-05-22

## 2018-05-22 NOTE — TELEPHONE ENCOUNTER
So does that mean that Amrik Butt may have taken Keppra 1500mg this morning? If he is not having significant side effects, he should continue to take Keppra as prescribe, it is not necessary to hold the evening dose, if there are no side effects  If he complains of fatigue, disorientation, then it is reasonable to hold the next dose and restart in the morning

## 2018-05-22 NOTE — TELEPHONE ENCOUNTER
Pt's mom made aware  She did confirm with the pt that he took 2 tabs this morning  When she saw him he had no side effects

## 2018-05-22 NOTE — TELEPHONE ENCOUNTER
pt's mom called and states that both keppra pills are missing out of his pill box for this am  pt is to take keppra 750mg 1 tabs bid  she is unable to reach him at this time to verify and if having any side effects  she would like to know if he should take another dose tonight    [phone]

## 2018-05-24 ENCOUNTER — OFFICE VISIT (OUTPATIENT)
Dept: NEUROLOGY | Facility: CLINIC | Age: 23
End: 2018-05-24
Payer: COMMERCIAL

## 2018-05-24 VITALS
DIASTOLIC BLOOD PRESSURE: 60 MMHG | HEIGHT: 71 IN | HEART RATE: 76 BPM | WEIGHT: 143 LBS | RESPIRATION RATE: 14 BRPM | BODY MASS INDEX: 20.02 KG/M2 | SYSTOLIC BLOOD PRESSURE: 108 MMHG

## 2018-05-24 DIAGNOSIS — D35.2 PITUITARY MICROADENOMA (HCC): ICD-10-CM

## 2018-05-24 DIAGNOSIS — G40.909 UNCLASSIFIED EPILEPTIC SEIZURES (HCC): Primary | ICD-10-CM

## 2018-05-24 PROBLEM — G40.409 GRAND MAL SEIZURE (HCC): Status: RESOLVED | Noted: 2018-01-08 | Resolved: 2018-05-24

## 2018-05-24 PROCEDURE — 99214 OFFICE O/P EST MOD 30 MIN: CPT | Performed by: PSYCHIATRY & NEUROLOGY

## 2018-05-24 RX ORDER — LISDEXAMFETAMINE DIMESYLATE 70 MG/1
50 CAPSULE ORAL EVERY MORNING
Refills: 0 | COMMUNITY
Start: 2018-04-24 | End: 2020-10-14

## 2018-05-24 RX ORDER — RISPERIDONE 1 MG/1
1 TABLET, FILM COATED ORAL 2 TIMES DAILY
Refills: 2 | COMMUNITY
Start: 2018-04-03 | End: 2020-04-15 | Stop reason: ALTCHOICE

## 2018-05-24 RX ORDER — LEVETIRACETAM 1000 MG/1
1000 TABLET ORAL 2 TIMES DAILY
Qty: 180 TABLET | Refills: 1 | Status: SHIPPED | OUTPATIENT
Start: 2018-05-24 | End: 2018-10-04

## 2018-05-24 RX ORDER — ATOMOXETINE 100 MG/1
100 CAPSULE ORAL DAILY
COMMUNITY

## 2018-05-24 NOTE — PATIENT INSTRUCTIONS
Due to recent (March 2018) involuntary left head jerking activity, presumed focal motor seizure  Tolerating levetiracetam, but side effects are mostly psychiatric, mood swings, irritability and anger issues  Recommend dose increase from recent seizure  Options posed included switching to oxcarbazepine (Trileptal) if irritability and anger is an issue with levetiracetam   I reviewed side effects of oxcarbazepine, which include, but not limited to, drug rash, Ino Aures syndrome, bone marrow suppression, hyponatremia, liver toxicity, mood swings, irritability, suicidal ideation, medication interactions, ataxia, incoordination and cognitive impairment  PLAN:  1 - continue with levetiracetam 750mg twice a day until your current supply runs out, new prescription for levetiracetam 1000mg twice a day at the next refill  2 - if patient has recurrent focal motor seizures while on levetiracetam, call the office for further instructions, option is to prescribe levetiracetam 250mg tab to make up difference  3 - levetiracetam, BUN, Cr blood test before MRI study  4 - MRI brain/pituitary dedicated study to assess for macroadenoma of the pituitary  5 - follow-up in 4 months  6 - call around July 7th regarding Seizure Reporting form for [facility]  I discussed seizure safety and seizure first aid with the patient  Limits would be no unprotected heights (ladders, standing on chairs/tables), should not swim alone (need partners or ), cooking with a partner to avoid burn injuries, showers instead of baths  I reviewed that convulsive seizures typically last about 2 minutes with about 15-30 minutes of recovery  Should a seizure last more than 5 minutes or patient fails to recover after 30 minutes or there are multiple seizures in a day or if there is a suspected head injury then EMS should be called or they go to the nearest emergency room    If he only experiences altered awareness, confusion, or focal seizures, then they may call the office for guidance  Seizure first aid consists of preventing the patient from wandering or removal of dangerous objects or prevention of injury, for convulsive seizures, position the patient on the ground, may place a pillow under the head, turn the patient to his side, no objects or reaching for the mouth, no restraints but prevent injuries by removing glasses or sharp/heavy objects, and time the duration of the seizure  I recommend that he obtain a medical alert bracelet that states "Seizure disorder" or "Epilepsy" along with any medication allergies  We discussed issues related to driving  I explained to the patient that the law states that all patients who have a seizure must be reported to the [employer]V/[employer]  Patients cannot legally drive for 6 months after seizure in the state of [place] (6 months in the state of [place] and 3 months in the state of [place] )  He is not cleared to return to driving until he has been without a seizure for at least 6 months and cleared by the appropriate state DMV/[employer]  I also informed the patient that, although exceptions can be granted for patients with provoked seizures, exclusively nocturnal seizures, prolonged auras, or exclusively simple partial seizures, these exceptions are granted by the [place]V/[employer] and not by the physician

## 2018-05-24 NOTE — PROGRESS NOTES
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), due to the history of recurrent forced head version to the left but no clear altered awareness or preceding dyscognitive symptoms, followed by recent secondary generalized convulsion  He reports to be tolerating levetiracetam without worsening his anxiety or underlying irritability  He has co-morbid ADHD and possible autism spectrum disorder  We reviewed the diagnosis of focal epilepsy, medical management, seizure safety and seizure first aid, and driving restrictions  He cannot drive for 6 months after his last convulsive seizure  He continues to have occasional focal motor seizures (differential diagnosis includes tic movements but because his presenting seizure involved focal head jerks, I suspect that these head jerks are epileptic rather than movement disorder)  We discussed that [employer] may waive driving restrictions for focal motor seizures without altered awareness; but Mr Heydi Zuniga has not established a 2 year history of focal seizures ([place]), which may result in his license still being suspended until he achieves seizure (all types) freedom for 6 months  I reviewed side effects of levetiracetam, which include, but not limited to, mood swings, irritability, suicidal ideation, incoordination, cognitive impairment and GI upset, nausea, vomiting  He will inform me if there are any complications of levetiracetam, may consider transition to lamotrigine or oxcarbazepine  In regards to his pituitary microadenoma finding on the MRI brain study, he will need a dedicated MRI pituitary study      Plan:   1 - continue with levetiracetam 750mg twice a day until your current supply runs out, new prescription for levetiracetam 1000mg twice a day at the next refill  2 - if patient has recurrent focal motor seizures while on levetiracetam, call the office for further instructions, option is to prescribe levetiracetam 250mg tab to make up difference  3 - levetiracetam, BUN, Cr blood test before MRI study  4 - MRI brain/pituitary dedicated study to assess for macroadenoma of the pituitary  5 - follow-up in 4 months  6 - call around July 7th regarding Seizure Reporting form for [facility]  7 - if repeat MRI brain/pituitary study shows evidence of a pituitary microadenoma patient will see the endocrinologist    Problem List Items Addressed This Visit        Endocrine    Pituitary microadenoma [facility])    Relevant Orders    MRI brain pituitary wo and w contrast    BUN    Creatinine, serum       Nervous and Auditory    Unclassified epileptic seizures ([address] ) - Primary    Relevant Medications    levETIRAcetam (KEPPRA) 1000 MG tablet    Other Relevant Orders    Levetiracetam level          Chief Complaint:  Chief Complaint     Seizures         HPI:    Wagner George is a 26 y o  left handed male here for follow-up evaluation of focal epilepsy  Interval history 5/24/2018  Since the last visit, he had a couple of episodes of 1-2 seconds head twisting the to left, possibly 3 twitches, around March 11th  He may have forgotten a dose of his medication by a couple of days prior  There was no altered awareness, progression to loss of consciousness, or convulsion  He does not have tics  But he may have fiddling movements (such as playing with his ears) when he gets nervous  He has had bouts of anger and mood swings even before starting levetiracetam   He was started on risperidone earlier in the year and Strattera in the last month  AED/side effects/compliance:  Levetiracetam 750mg twice a day  He manages his own medication  Occasional missed doses    Event/Seizure semiology:  1  Sporadic force head version to the left  (no facial twitching or involvement of the left arm)      2  Recurrent force head version to the left (tries to force his head back midline and say that he is okay), then secondary generalization  Prior Epilepsy History:  Intake History 2/15/2018  From [facility] during hospitalization 2018  Mr Tana Driscoll is an adopted [place] male who had presented to hospital on 1/7/2018 with new onset seizure after playing video games  He was seen by Dr Corrina Jose from the neurology consult service  He reported having a semi-familiar feeling of his mind not being there, turned his head to the left, then he had a generalized convulsion  He woke up with EMT around him, feeling confused, slurring his speech  He had a second seizure while arriving at the ED  He reported that he had been having recurrent feelings of cannot control his mind and turned to the left  These events have become more frequent, about once every few weeks  He was subsequently started on Keppra 750mg twice a day  Patient's history:  He recalled his first seizure (age 23years old) on 1/7/2018; he noticed his head was turning to left and twitching, then he was shaking and fell down, (heard his friend called out "Oh my God")and unable to move the way he wanted to move, then he lost consciousness  About 30 minutes before he had an episode of forced head movement to the left  He denies a warning, feeling out of sorts, or jenny vu sensation  He had a second seizure about 90 minutes later, his parents witness that he had uncontrollable forced head movement to the left (he would say I'm okay) just before he had his second convulsion  He has had recurrent episodes of involuntary head turning to the left in the last 6-12 months, for a few seconds, there is no confusion or sensation of feeling spacey  Just before it happens he may have a moment of       Disorientation (but he is not sure if this is the correct sensation)  He has a history of ADHD and on the Asperger's with mood swings and anxiety  There is no worsening of his anxiety      The patient denies any history of myoclonus, staring spells, automatisms, unexplained hyperkinetic behaviors, olfactory / gustatory hallucinations, epigastric rising events, jenny vu events, visual hallucinations, unexplained nocturnal enuresis, or nocturnal tongue biting  Special Features  Status epilepticus: No  Self Injury Seizures: No  Precipitating Factors: None    Epilepsy Risk Factors:  Unknown maternal history because he is adopted from [facility]  Abnormal birth/: born around 37 week, hyperbilirubinemia with light therapy  Abnormal Development: he was adopted around 9 months, maybe a late walker but there were more falls  He needed physical therapy for fine motor skills  Febrile seizures, simple: No  Febrile seizures, complex: No  CNS infection: No  Mental retardation: No  Cerebral palsy: No  Head injury (moderate/severe): No  CNS neoplasm: No  CNS malformation: No  Neurosurgical procedure: No  Stroke: No  Alcohol abuse: No  Drug abuse: No  Family history Sz/epilepsy: Unknown    Prior AEDs:  medication Max dose Time used Reason to stop   levetiracetam              Prior workup:  x  [id number]   MRI brain  No acute pathology; temporal lobes are normal, symmetric hippocampi  Incidental 3-4 mm possible pituitary microadenoma    EEGs:  2018  Normal awake and asleep EEG     3/1/2018  Normal awake and drowsy EEG    Labs:  2018  CBC 4 0 4/14 8/43/173  /4 //9/0 77/87  LFT 6 2/3 8/0 18/18/64  Levetiracetam 25 5    Past Medical/Surgical History:  Past Medical History:   Diagnosis Date    ADHD     Anxiety     Asperger syndrome      History reviewed  No pertinent surgical history      Past Psychiatric History:  Depression: No  Anxiety: Yes, ADHD  Psychosis: No  He sees a psychiatrist for ADHD, his prior psychiatrist (Dr Cisco Mclaughlin thought that he was on the Spectrum)    Medications:    Current Outpatient Prescriptions:     atoMOXetine (STRATTERA) 40 mg capsule, Take 40 mg by mouth daily 2 tablets in the morning, Disp: , Rfl:     levETIRAcetam (KEPPRA) 1000 MG tablet, Take 1 tablet (1,000 mg total) by mouth 2 (two) times a day, Disp: 180 tablet, Rfl: 1    risperiDONE (RisperDAL) 1 mg tablet, Take 1 mg by mouth 2 (two) times a day, Disp: , Rfl: 2    VYVANSE 70 MG capsule, Take 70 mg by mouth every morning, Disp: , Rfl: 0    Allergies:  No Known Allergies    Family history:  Family History   Problem Relation Age of Onset    Adopted: Yes    Family history unknown: Yes       Social History  Living situation:  Lives with adopted parents  Work: He went to college for 1 year and struggled with attendance  Driving:  No   reports that he has never smoked  He has never used smokeless tobacco  He reports that he does not drink alcohol or use drugs  General exam   Blood pressure 108/60, pulse 76, resp  rate 14, height 5' 11" (1 803 m), weight 64 9 kg (143 lb)  GENERAL:  normally developed person in no acute distress, atraumatic head  Eyes: Anicteric  Carotids: n/a  Heart: Regular rate and rhythm  Chest: clear to auscultation    MENTAL STATUS  Orientation: Alert and oriented x 3  Fund of knowledge: Intact  Attention/concentration: Intact  Recent/remote memory: intact  Language: speech is normal, no aphasia, comprehension intact    OPHTHALMOSCOPIC  Fundus/Optic discs/Posterior segments: n/a    CRANIAL NERVES  II: PERRL  III, IV, VI: Extraocular movements intact  No nystagmus  V: n/a  VII: Facial movements normal and symmetric  VIII: n/a  IX, X: no dysarthria  XI: Intact trapezius, SCM strength  XII: Tongue protrudes to the midline    MOTOR (Upper and lower extremities)   Bulk/tone/abnormal movement: Normal muscle bulk and tone  Drift: No pronator drift  Strength: Strength 5/5 throughout  COORDINATION   F/N: normal  FFM: normal  SUZI: normal  Station/Gait: Normal  gait  Normal tandem gait  SENSORY  Light touch: normal  Romberg sign absent      Reflexes:  n/a    Decision making was of high-complexity due to the patient's high risk condition (seizures), psychiatric and neuropsychological comorbidities, behavioral problems, memory and cognitive problems and medication side effects  The total amount of time spent with the patient was 30 minutes  More than 50% of this time was devoted to counseling and coordination of care  Issues addressed during this clinic visit included overall management, medication counseling or monitoring (including adverse effects, side effects and risks of antiepileptic medications)     Start time: 8:55AM  End time: 9:25AM

## 2018-05-24 NOTE — LETTER
May 24, 2018     Shyann Adams MD  [address]  [address] [place] [id number]    Patient: Gabriella Zacarias   YOB: 1995   Date of Visit: 2018       Dear Dr Pedro Austin:    Thank you for referring Gabriella Zacarias to me for evaluation  Below are my notes for this consultation  If you have questions, please do not hesitate to call me  I look forward to following your patient along with you  Sincerely,        Anthony Hollins MD        CC: No Recipients  Anthony Hollins MD  2018 10:11 PM  Sign at close encounter  Name: Gabriella Zacarias   : 1995   Visit Type: follow-up  Referring MD / PCP:  hSyann Adams MD    Assessment:  Mr Gabriella Zacarias is a 26 y o  male with 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), due to the history of recurrent forced head version to the left but no clear altered awareness or preceding dyscognitive symptoms, followed by recent secondary generalized convulsion  He reports to be tolerating levetiracetam without worsening his anxiety or underlying irritability  He has co-morbid ADHD and possible autism spectrum disorder  We reviewed the diagnosis of focal epilepsy, medical management, seizure safety and seizure first aid, and driving restrictions  He cannot drive for 6 months after his last convulsive seizure  He continues to have occasional focal motor seizures (differential diagnosis includes tic movements but because his presenting seizure involved focal head jerks, I suspect that these head jerks are epileptic rather than movement disorder)  We discussed that [employer] may waive driving restrictions for focal motor seizures without altered awareness; but Mr Sloane Dangelo has not established a 2 year history of focal seizures ([place]), which may result in his license still being suspended until he achieves seizure (all types) freedom for 6 months      I reviewed side effects of levetiracetam, which include, but not limited to, mood swings, irritability, suicidal ideation, incoordination, cognitive impairment and GI upset, nausea, vomiting  He will inform me if there are any complications of levetiracetam, may consider transition to lamotrigine or oxcarbazepine  In regards to his pituitary microadenoma finding on the MRI brain study, he will need a dedicated MRI pituitary study  Plan:   1 - continue with levetiracetam 750mg twice a day until your current supply runs out, new prescription for levetiracetam 1000mg twice a day at the next refill  2 - if patient has recurrent focal motor seizures while on levetiracetam, call the office for further instructions, option is to prescribe levetiracetam 250mg tab to make up difference  3 - levetiracetam, BUN, Cr blood test before MRI study  4 - MRI brain/pituitary dedicated study to assess for macroadenoma of the pituitary  5 - follow-up in 4 months  6 - call around July 7th [facility]  7 - if repeat MRI brain/pituitary study shows evidence of a pituitary microadenoma patient will see the endocrinologist    Problem List Items Addressed This Visit        Endocrine    Pituitary microadenoma [facility])    Relevant Orders    MRI brain pituitary wo and w contrast    BUN    Creatinine, serum       Nervous and Auditory    Unclassified epileptic seizures ([address] ) - Primary    Relevant Medications    levETIRAcetam (KEPPRA) 1000 MG tablet    Other Relevant Orders    Levetiracetam level          Chief Complaint:  Chief Complaint     Seizures         HPI:    Gwendolyn Jacobson is a 26 y o  left handed male here for follow-up evaluation of focal epilepsy  Interval history 5/24/2018  Since the last visit, he had a couple of episodes of 1-2 seconds head twisting the to left, possibly 3 twitches, around March 11th  He may have forgotten a dose of his medication by a couple of days prior  There was no altered awareness, progression to loss of consciousness, or convulsion  He does not have tics  But he may have fiddling movements (such as playing with his ears) when he gets nervous  He has had bouts of anger and mood swings even before starting levetiracetam   He was started on risperidone earlier in the year and Strattera in the last month  AED/side effects/compliance:  Levetiracetam 750mg twice a day  He manages his own medication  Occasional missed doses    Event/Seizure semiology:  1  Sporadic force head version to the left  (no facial twitching or involvement of the left arm)  2  Recurrent force head version to the left (tries to force his head back midline and say that he is okay), then secondary generalization  Prior Epilepsy History:  Intake History 2/15/2018  From [facility] during hospitalization 2018  Mr Paulina Mcmillan is an adopted [place] male who had presented to hospital on 1/7/2018 with new onset seizure after playing video games  He was seen by Dr Jnay Whiting from the neurology consult service  He reported having a semi-familiar feeling of his mind not being there, turned his head to the left, then he had a generalized convulsion  He woke up with EMT around him, feeling confused, slurring his speech  He had a second seizure while arriving at the ED  He reported that he had been having recurrent feelings of cannot control his mind and turned to the left  These events have become more frequent, about once every few weeks  He was subsequently started on Keppra 750mg twice a day  Patient's history:  He recalled his first seizure (age 23years old) on 1/7/2018; he noticed his head was turning to left and twitching, then he was shaking and fell down, (heard his friend called out "Oh my God")and unable to move the way he wanted to move, then he lost consciousness  About 30 minutes before he had an episode of forced head movement to the left  He denies a warning, feeling out of sorts, or jenny vu sensation    He had a second seizure about 90 minutes later, his parents witness that he had uncontrollable forced head movement to the left (he would say I'm okay) just before he had his second convulsion  He has had recurrent episodes of involuntary head turning to the left in the last 6-12 months, for a few seconds, there is no confusion or sensation of feeling spacey  Just before it happens he may have a moment of       Disorientation (but he is not sure if this is the correct sensation)  He has a history of ADHD and on the Asperger's with mood swings and anxiety  There is no worsening of his anxiety  The patient denies any history of myoclonus, staring spells, automatisms, unexplained hyperkinetic behaviors, olfactory / gustatory hallucinations, epigastric rising events, jenny vu events, visual hallucinations, unexplained nocturnal enuresis, or nocturnal tongue biting  Special Features  Status epilepticus: No  Self Injury Seizures: No  Precipitating Factors: None    Epilepsy Risk Factors:  Unknown maternal history because he is adopted from [facility]  Abnormal birth/: born around 37 week, hyperbilirubinemia with light therapy  Abnormal Development: he was adopted around 9 months, maybe a late walker but there were more falls    He needed physical therapy for fine motor skills  Febrile seizures, simple: No  Febrile seizures, complex: No  CNS infection: No  Mental retardation: No  Cerebral palsy: No  Head injury (moderate/severe): No  CNS neoplasm: No  CNS malformation: No  Neurosurgical procedure: No  Stroke: No  Alcohol abuse: No  Drug abuse: No  Family history Sz/epilepsy: Unknown    Prior AEDs:  medication Max dose Time used Reason to stop   levetiracetam              Prior workup:  x  [id number]   MRI brain  No acute pathology; temporal lobes are normal, symmetric hippocampi  Incidental 3-4 mm possible pituitary microadenoma    EEGs:  2018  Normal awake and asleep EEG     3/1/2018  Normal awake and drowsy EEG    Labs:  2018  CBC 4 0  4/14 8/43/173  /4 2/105/26/9/0 77/87  LFT 6 2/3 8/0 6/18/18/64  Levetiracetam 25 5    Past Medical/Surgical History:  Past Medical History:   Diagnosis Date    ADHD     Anxiety     Asperger syndrome      History reviewed  No pertinent surgical history  Past Psychiatric History:  Depression: No  Anxiety: Yes, ADHD  Psychosis: No  He sees a psychiatrist for ADHD, his prior psychiatrist (Dr Leonora Molina thought that he was on the [facility])    Medications:    Current Outpatient Prescriptions:     atoMOXetine (STRATTERA) 40 mg capsule, Take 40 mg by mouth daily 2 tablets in the morning, Disp: , Rfl:     levETIRAcetam (KEPPRA) 1000 MG tablet, Take 1 tablet (1,000 mg total) by mouth 2 (two) times a day, Disp: 180 tablet, Rfl: 1    risperiDONE (RisperDAL) 1 mg tablet, Take 1 mg by mouth 2 (two) times a day, Disp: , Rfl: 2    VYVANSE 70 MG capsule, Take 70 mg by mouth every morning, Disp: , Rfl: 0    Allergies:  No Known Allergies    Family history:  Family History   Problem Relation Age of Onset    Adopted: Yes    Family history unknown: Yes       Social History  Living situation:  Lives with adopted parents  Work: He went to college for 1 year and struggled with attendance  Driving:  No   reports that he has never smoked  He has never used smokeless tobacco  He reports that he does not drink alcohol or use drugs  General exam   Blood pressure 108/60, pulse 76, resp  rate 14, height 5' 11" (1 803 m), weight 64 9 kg (143 lb)  GENERAL:  normally developed person in no acute distress, atraumatic head  Eyes: Anicteric  Carotids: n/a  Heart: Regular rate and rhythm  Chest: clear to auscultation    MENTAL STATUS  Orientation: Alert and oriented x 3  Fund of knowledge: Intact  Attention/concentration: Intact  Recent/remote memory: intact  Language: speech is normal, no aphasia, comprehension intact    OPHTHALMOSCOPIC  Fundus/Optic discs/Posterior segments: n/a    CRANIAL NERVES  II: PERRL      III, IV, VI: Extraocular movements intact  No nystagmus  V: n/a  VII: Facial movements normal and symmetric  VIII: n/a  IX, X: no dysarthria  XI: Intact trapezius, SCM strength  XII: Tongue protrudes to the midline    MOTOR (Upper and lower extremities)   Bulk/tone/abnormal movement: Normal muscle bulk and tone  Drift: No pronator drift  Strength: Strength 5/5 throughout  COORDINATION   F/N: normal  FFM: normal  SUZI: normal  Station/Gait: Normal  gait  Normal tandem gait  SENSORY  Light touch: normal  Romberg sign absent  Reflexes:  n/a    Decision making was of high-complexity due to the patient's high risk condition (seizures), psychiatric and neuropsychological comorbidities, behavioral problems, memory and cognitive problems and medication side effects  The total amount of time spent with the patient was 30 minutes  More than 50% of this time was devoted to counseling and coordination of care  Issues addressed during this clinic visit included overall management, medication counseling or monitoring (including adverse effects, side effects and risks of antiepileptic medications)     Start time: 8:55AM  End time: 9:25AM

## 2018-07-06 ENCOUNTER — TELEPHONE (OUTPATIENT)
Dept: NEUROLOGY | Facility: CLINIC | Age: 23
End: 2018-07-06

## 2018-07-06 NOTE — TELEPHONE ENCOUNTER
Pt came to office to drop off Seizure Reporting Form, would like a phone call when completed [phone]  Pt aware its a 2 week turn around rate  Form placed in clinical bin

## 2018-07-11 ENCOUNTER — HOSPITAL ENCOUNTER (OUTPATIENT)
Dept: RADIOLOGY | Facility: HOSPITAL | Age: 23
Discharge: HOME | End: 2018-07-11
Attending: PSYCHIATRY & NEUROLOGY
Payer: COMMERCIAL

## 2018-07-11 DIAGNOSIS — D35.2 PITUITARY MICROADENOMA (HCC): ICD-10-CM

## 2018-07-11 PROCEDURE — 70553 MRI BRAIN STEM W/O & W/DYE: CPT

## 2018-07-11 PROCEDURE — A9585 GADOBUTROL INJECTION: HCPCS | Performed by: PSYCHIATRY & NEUROLOGY

## 2018-07-11 RX ADMIN — GADOBUTROL 6 ML: 604.72 INJECTION INTRAVENOUS at 20:42

## 2018-07-12 ENCOUNTER — TELEPHONE (OUTPATIENT)
Dept: NEUROLOGY | Facility: CLINIC | Age: 23
End: 2018-07-12

## 2018-07-12 NOTE — TELEPHONE ENCOUNTER
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  This is a developmental anomaly or cortical malformation  The "grey matter" are the neurons that are the work horses of the brain  Typically, during development cortical neurons that become part of the cortex develop around the ventricles and migrate to the surface of the brain  However, for some people this development is impaired leaving neurons in the wrong place which we call heterotopic grey matter  This neurons don't work as they would in the cortex    Sometimes heterotopic grey matter doesn't do anything, but for some people it can be the source of seizures and/or cognitive or learning disabilities

## 2018-07-12 NOTE — TELEPHONE ENCOUNTER
Patient's mother returning call from Dr Jefferson Rutledge, she is requesting you call her back @ [phone]

## 2018-07-12 NOTE — TELEPHONE ENCOUNTER
Spoke with Taina Peguero and reviewed the information below  Patient has not had a seizure for the past 6 months and is requesting his 's license be re-instated  Mother reports that she had dropped off [employer] forms  Please submit for my signature

## 2018-07-12 NOTE — TELEPHONE ENCOUNTER
Dr Maryellen Mccain  There are immediate findings on brain MRI per [facility] radiology  [phone]    Note given to [facility]

## 2018-07-13 ENCOUNTER — DOCUMENTATION (OUTPATIENT)
Dept: NEUROLOGY | Facility: CLINIC | Age: 23
End: 2018-07-13

## 2018-07-26 ENCOUNTER — TELEPHONE (OUTPATIENT)
Dept: NEUROLOGY | Facility: CLINIC | Age: 23
End: 2018-07-26

## 2018-07-26 NOTE — TELEPHONE ENCOUNTER
Pt called to state that he has not received anything from [facility] regarding his seizure reporting form  Pt notified that form was faxed on 7/13/18  Pt provided with [employer] number  Pt asking if we could refax from  Form refaxed

## 2018-08-06 NOTE — TELEPHONE ENCOUNTER
Patient states that [facility] did not receive our Seizure form  He provided alt  Fax number to send it to  [facility] [place] @ [phone] (however this is not an alt fax number)  Informed the patient I will re fax and asked he call [facility] tomorrow and f/u to see if they received our form  Patient verbalized understanding

## 2018-10-04 ENCOUNTER — OFFICE VISIT (OUTPATIENT)
Dept: NEUROLOGY | Facility: CLINIC | Age: 23
End: 2018-10-04
Payer: COMMERCIAL

## 2018-10-04 VITALS
SYSTOLIC BLOOD PRESSURE: 100 MMHG | DIASTOLIC BLOOD PRESSURE: 70 MMHG | HEIGHT: 71 IN | WEIGHT: 150.8 LBS | BODY MASS INDEX: 21.11 KG/M2 | HEART RATE: 101 BPM

## 2018-10-04 DIAGNOSIS — F41.9 ANXIETY: ICD-10-CM

## 2018-10-04 DIAGNOSIS — G40.909 UNCLASSIFIED EPILEPTIC SEIZURES (HCC): Primary | ICD-10-CM

## 2018-10-04 DIAGNOSIS — F90.9 ATTENTION DEFICIT HYPERACTIVITY DISORDER (ADHD), UNSPECIFIED ADHD TYPE: ICD-10-CM

## 2018-10-04 DIAGNOSIS — F84.0 AUTISM SPECTRUM: ICD-10-CM

## 2018-10-04 DIAGNOSIS — G47.09 OTHER INSOMNIA: ICD-10-CM

## 2018-10-04 PROCEDURE — 99214 OFFICE O/P EST MOD 30 MIN: CPT | Performed by: PSYCHIATRY & NEUROLOGY

## 2018-10-04 RX ORDER — LEVETIRACETAM 500 MG/1
2000 TABLET, EXTENDED RELEASE ORAL DAILY
Qty: 360 TABLET | Refills: 1 | Status: SHIPPED | OUTPATIENT
Start: 2018-10-04 | End: 2019-04-26 | Stop reason: SDUPTHER

## 2018-10-04 NOTE — PATIENT INSTRUCTIONS
PLAN:  1 - continue with Levetiracetam 1000mg tab take 1 tab twice a day until no more, last one should be evening dose  Then the next morning start with Levetiracetam XR/ER 500mg tab take 4 tabs daily (in the morning)    2 - Levetiracetam level as soon as you can while on the immediate release  3 - call the office if you have recurrent focal motor seizures  4 - return in 3 months with Advanced Practitioner and 6 months with Dr Bj Sinclair

## 2018-10-04 NOTE — PROGRESS NOTES
Patient ID: Krishna Guadalupe is a 26 y o  male  Assessment/Plan:    No problem-specific Assessment & Plan notes found for this encounter  {Assess/PlanSmartLinks:43824}       Subjective:    HPI    {[facility] HPI texts:03659}    {Common ambulatory SmartLinks:06225}         Objective:    Blood pressure 100/70, pulse 101, height 5' 11" (1 803 m), weight 68 4 kg (150 lb 12 8 oz)  Physical Exam    Neurological Exam      ROS:    Review of Systems   Constitutional: Negative  Negative for appetite change and fever  HENT: Negative  Negative for hearing loss, tinnitus, trouble swallowing and voice change  Eyes: Negative  Negative for photophobia and pain  Respiratory: Negative  Negative for shortness of breath  Cardiovascular: Negative  Negative for palpitations  Gastrointestinal: Negative  Negative for nausea and vomiting  Endocrine: Negative  Negative for cold intolerance and heat intolerance  Genitourinary: Negative  Negative for dysuria, frequency and urgency  Musculoskeletal: Negative  Negative for myalgias and neck pain  Skin: Negative  Negative for rash  Neurological: Negative  Negative for dizziness, tremors, seizures, syncope, facial asymmetry, speech difficulty, weakness, light-headedness, numbness and headaches  Hematological: Negative  Does not bruise/bleed easily  Psychiatric/Behavioral: Negative  Negative for confusion, hallucinations and sleep disturbance

## 2018-10-04 NOTE — LETTER
2018     Clarissa Phillips MD  [id number] [place] [id number]    Patient: Oleg Lopes   YOB: 1995   Date of Visit: 10/4/2018       Dear Dr Carlos Lux:    Thank you for referring Oleg Lopes to me for evaluation  Below are my notes for this consultation  If you have questions, please do not hesitate to call me  I look forward to following your patient along with you  Sincerely,        Gita Webb MD        CC: No Recipients  Gita Webb MD  10/4/2018  4:40 PM  Sign at close encounter  Name: Oleg Lopes   : 1995   Visit Type: follow-up  Referring MD / PCP:  Clarisas Phillips MD    Assessment:  Mr Oleg Lopes is a 26 y o  male with 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 and 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), it is difficulty in determining which one is the more active site (if there is forced head version to the left, one would suspect the right anterior frontal region as it may involve the frontal eye fields)  At current dose, he has not had a recurrent seizure in the last 6 months  He is tolerating levetiracetam without worsening his anxiety or underlying irritability  He has co-morbid ADHD and possible autism spectrum disorder  He may have difficulty remembering to take his evening dose of medication if he is not home  He is a good candidate to transition to extended release levetiracetam, which would allow once a day dosing in the morning that may lessen the risk of medication noncompliance  We previously reviewed the diagnosis of focal epilepsy, medical management, seizure safety and seizure first aid, and driving restrictions  He has regained his 's license  He is aware that if he has another seizure then he cannot drive for 6 months of seizure freedom    He continues to have occasional focal motor seizures (differential diagnosis includes tic movements but because his presenting seizure involved focal head jerks, I suspect that these head jerks are epileptic rather than movement disorder)  We discussed that [place] may waive driving restrictions for focal motor seizures without altered awareness  There is no pituitary adenoma on dedicated imaging study  We discussed that patients with epilepsy in general have a higher risk for psychiatric and cognitive disorders including anxiety, autism spectrum, and ADHD  The converse is also true that psychiatric conditions have a higher risk of seizure disorders  Whether or not, the above mentioned cortical malformations contribute to his psychiatric diagnoses is difficult to say for certainty because there are patients with these conditions without cortical malformations  But the presence of cortical malformations increase the risk of cognitive and psychiatric disorders  Plan:   1 - continue with Levetiracetam 1000mg tab take 1 tab twice a day until no more, last one should be evening dose  Then the next morning start with Levetiracetam XR/ER 500mg tab take 4 tabs daily (in the morning) (same total daily dose of 2000mg)  They will call the office if Levetiacetam XR is too expensive or not covered by their insurance    2 - Levetiracetam level as soon as you can while on the immediate release  3 - call the office if you have recurrent focal motor seizures  4 - return in 3 months with Advanced Practitioner and 6 months with Dr Kimberly Monreal    Problem List Items Addressed This Visit        Nervous and Auditory    Unclassified epileptic seizures ([address] ) - Primary    Relevant Medications    levETIRAcetam (KEPPRA XR) 500 MG 24 hr tablet    Other Relevant Orders    Levetiracetam level      Other Visit Diagnoses     Other insomnia              Chief Complaint:  Chief Complaint     Seizures         HPI:    Kristen Rueda is a 26 y o  left handed male here for follow-up evaluation of focal epilepsy  Interval history 10/4/2018  MRI brain study with dedicated pituitary study did not detect a pituitary adenoma  However, three areas with cortical dysplasia (left insular cortex) or heterotopic gray matter were found (left posterior lateral frontal, right anterior frontal)  He has been doing well  His mood has been very stable, no erratic changes  He denies recurrent focal motor seizure, convulsion, period of altered awareness or loss of consciousness  He states that his compliance is very good, with back-up from his mother  His mother added that he may have difficulty remembering to take the evening dose if he is out with his friends  If he is at home, his mother will remind him to take his medication when she has to take hers  AED/side effects/compliance:  Levetiracetam 1000mg twice a day  He manages his own medication  He uses a pill box, his mother helps remind him  Event/Seizure semiology:  1  Sporadic force head version to the left  (no facial twitching or involvement of the left arm)  2  Recurrent force head version to the left (tries to force his head back midline and say that he is okay), then secondary generalization  Prior Epilepsy History:  Intake History 2/15/2018  From Neurology consultants during hospitalization 2018  Mr Steve Elder is an adopted [facility] male who had presented to hospital on 1/7/2018 with new onset seizure after playing video games  He was seen by Dr Summer Condon from the neurology consult service  He reported having a semi-familiar feeling of his mind not being there, turned his head to the left, then he had a generalized convulsion  He woke up with EMT around him, feeling confused, slurring his speech  He had a second seizure while arriving at the ED  He reported that he had been having recurrent feelings of cannot control his mind and turned to the left  These events have become more frequent, about once every few weeks    He was subsequently started on Keppra 750mg twice a day  Patient's history:  He recalled his first seizure (age 23years old) on 1/7/2018; he noticed his head was turning to left and twitching, then he was shaking and fell down, (heard his friend called out "Oh my God")and unable to move the way he wanted to move, then he lost consciousness  About 30 minutes before he had an episode of forced head movement to the left  He denies a warning, feeling out of sorts, or jenny vu sensation  He had a second seizure about 90 minutes later, his parents witness that he had uncontrollable forced head movement to the left (he would say I'm okay) just before he had his second convulsion  He has had recurrent episodes of involuntary head turning to the left in the last 6-12 months, for a few seconds, there is no confusion or sensation of feeling spacey  Just before it happens he may have a moment of       Disorientation (but he is not sure if this is the correct sensation)  He has a history of ADHD and on the Asperger's with mood swings and anxiety  There is no worsening of his anxiety  The patient denies any history of myoclonus, staring spells, automatisms, unexplained hyperkinetic behaviors, olfactory / gustatory hallucinations, epigastric rising events, jenny vu events, visual hallucinations, unexplained nocturnal enuresis, or nocturnal tongue biting  Interval history 5/24/2018  -750  There were a couple of episodes of 1-2 seconds head twisting the to left, possibly 3 twitches, around March 11th  He may have forgotten a dose of his medication by a couple of days prior  There was no altered awareness, progression to loss of consciousness, or convulsion  He has had bouts of anger and mood swings even before starting levetiracetam   He was started on risperidone earlier in the year and Strattera in the last month        Special Features  Status epilepticus: No  Self Injury Seizures: No  Precipitating Factors: None    Epilepsy Risk Factors:  Unknown maternal history because he is adopted from [facility]  Abnormal birth/: born around 40 week, hyperbilirubinemia with light therapy  Abnormal Development: he was adopted around 9 months, maybe a late walker but there were more falls  He needed physical therapy for fine motor skills  Febrile seizures, simple: No  Febrile seizures, complex: No  CNS infection: No  Mental retardation: No  Cerebral palsy: No  Head injury (moderate/severe): No  CNS neoplasm: No  CNS malformation: No  Neurosurgical procedure: No  Stroke: No  Alcohol abuse: No  Drug abuse: No  Family history Sz/epilepsy: Unknown    Prior AEDs:  medication Max dose Time used Reason to stop   levetiracetam              Prior workup:  I have reviewed the MRI brain study myself; I showed the areas of heterotopic gray matter  [id number]   MRI brain  No acute pathology; temporal lobes are normal, symmetric hippocampi  Incidental 3-4 mm possible pituitary microadenoma    2018  MRI brain pituitary  Heterotopic gray matter in the left posterior lateral frontal lobe and in the right frontal lobe slightly more anteriorly  The left heterotopic gray matter extends to the surface of the left lateral vetnrical  Mild thickening of the left insula, possibly focal cortical dysplasia  Normal size of pituitary gland, no adenoma    EEGs:  2018  Normal awake and asleep EEG     3/1/2018  Normal awake and drowsy EEG    Labs:  2018  CBC 4 0 4/14 8/43/173  /4 /105/26/9/0 77/87  LFT 6 2/3 8/0 6/18/18/64  Levetiracetam 25 5    2018  BUN 12  Cr 0 81  Levetiracetam level not done    Past Medical/Surgical History:  Past Medical History:   Diagnosis Date    ADHD     Anxiety     Asperger syndrome      History reviewed  No pertinent surgical history      Past Psychiatric History:  Depression: No  Anxiety: Yes, ADHD  Psychosis: No  He sees a psychiatrist for ADHD, his prior psychiatrist (Dr Cecilio Carmona thought that he was on the Spectrum)    Medications:    Current Outpatient Prescriptions:     atoMOXetine (STRATTERA) 40 mg capsule, Take 100 mg by mouth daily 1 tablet in the morning , Disp: , Rfl:     risperiDONE (RisperDAL) 1 mg tablet, Take 1 mg by mouth 2 (two) times a day, Disp: , Rfl: 2    VYVANSE 70 MG capsule, Take 70 mg by mouth every morning, Disp: , Rfl: 0    levETIRAcetam (KEPPRA XR) 500 MG 24 hr tablet, Take 4 tablets (2,000 mg total) by mouth daily, Disp: 360 tablet, Rfl: 1    Allergies:  No Known Allergies    Family history:  Family History   Problem Relation Age of Onset    Adopted: Yes    Family history unknown: Yes       Social History  Living situation:  Lives with adopted parents  Work: He went to college for 1 year and struggled with attendance  Driving:  Yes   reports that he has never smoked  He has never used smokeless tobacco  He reports that he drinks alcohol  He reports that he does not use drugs  General exam   Blood pressure 100/70, pulse 101, height 5' 11" (1 803 m), weight 68 4 kg (150 lb 12 8 oz)  Exam is done today, unchanged from last office visit note  GENERAL:  normally developed person in no acute distress, atraumatic head  Eyes: Anicteric  Carotids: n/a  Heart: Regular rate and rhythm  Chest: clear to auscultation    MENTAL STATUS  Orientation: Alert and oriented x 3  Fund of knowledge: Intact  Attention/concentration: Intact  Recent/remote memory: intact  Language: speech is normal, no aphasia, comprehension intact    OPHTHALMOSCOPIC  Fundus/Optic discs/Posterior segments: n/a    CRANIAL NERVES  II: PERRL  III, IV, VI: Extraocular movements intact  No nystagmus  V: n/a  VII: Facial movements normal and symmetric  VIII: n/a  IX, X: no dysarthria  XI: Intact trapezius, SCM strength  XII: Tongue protrudes to the midline    MOTOR (Upper and lower extremities)   Bulk/tone/abnormal movement: Normal muscle bulk and tone  Drift: No pronator drift    Strength: Strength 5/5 throughout  COORDINATION   F/N: normal  FFM: normal  SUZI: normal  Station/Gait: Normal  gait  Normal tandem gait  SENSORY  Light touch: normal  Romberg sign absent  Reflexes:  n/a    Review of Systems  A review of at least 12 organ/systems was obtained by the medical assistant and reviewed by me, including additional positives/negatives:  A review of at least 12 organ/systems was evaluated and there are no complaints  Decision making was of high-complexity due to the patient's high risk condition (seizures), psychiatric and neuropsychological comorbidities, behavioral problems, memory and cognitive problems and medication side effects

## 2018-10-04 NOTE — PROGRESS NOTES
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), it is difficulty in determining which one is the more active site (if there is forced head version to the left, one would suspect the right anterior frontal region as it may involve the frontal eye fields)  At current dose, he has not had a recurrent seizure in the last 6 months  He is tolerating levetiracetam without worsening his anxiety or underlying irritability  He has co-morbid ADHD and possible autism spectrum disorder  He may have difficulty remembering to take his evening dose of medication if he is not home  He is a good candidate to transition to extended release levetiracetam, which would allow once a day dosing in the morning that may lessen the risk of medication noncompliance  We previously reviewed the diagnosis of focal epilepsy, medical management, seizure safety and seizure first aid, and driving restrictions  He has regained his 's license  He is aware that if he has another seizure then he cannot drive for 6 months of seizure freedom  He continues to have occasional focal motor seizures (differential diagnosis includes tic movements but because his presenting seizure involved focal head jerks, I suspect that these head jerks are epileptic rather than movement disorder)  We discussed that [employer] may waive driving restrictions for focal motor seizures without altered awareness  There is no pituitary adenoma on dedicated imaging study  We discussed that patients with epilepsy in general have a higher risk for psychiatric and cognitive disorders including anxiety, autism spectrum, and ADHD    The converse is also true that psychiatric conditions have a higher risk of seizure disorders  Whether or not, the above mentioned cortical malformations contribute to his psychiatric diagnoses is difficult to say for certainty because there are patients with these conditions without cortical malformations  But the presence of cortical malformations increase the risk of cognitive and psychiatric disorders  Plan:   1 - continue with Levetiracetam 1000mg tab take 1 tab twice a day until no more, last one should be evening dose  Then the next morning start with Levetiracetam XR/ER 500mg tab take 4 tabs daily (in the morning) (same total daily dose of 2000mg)  They will call the office if Levetiacetam XR is too expensive or not covered by their insurance  2 - Levetiracetam level as soon as you can while on the immediate release  3 - call the office if you have recurrent focal motor seizures  4 - return in 3 months with Advanced Practitioner and 6 months with Dr Wade Lemon    Problem List Items Addressed This Visit        Nervous and Auditory    Unclassified epileptic seizures ([address] ) - Primary    Relevant Medications    levETIRAcetam (KEPPRA XR) 500 MG 24 hr tablet    Other Relevant Orders    Levetiracetam level      Other Visit Diagnoses     Other insomnia              Chief Complaint:  Chief Complaint     Seizures         HPI:    Zi Mckeon is a 26 y o  left handed male here for follow-up evaluation of focal epilepsy  Interval history 10/4/2018  MRI brain study with dedicated pituitary study did not detect a pituitary adenoma  However, three areas with cortical dysplasia (left insular cortex) or heterotopic gray matter were found (left posterior lateral frontal, right anterior frontal)  He has been doing well  His mood has been very stable, no erratic changes  He denies recurrent focal motor seizure, convulsion, period of altered awareness or loss of consciousness  He states that his compliance is very good, with back-up from his mother  His mother added that he may have difficulty remembering to take the evening dose if he is out with his friends  If he is at home, his mother will remind him to take his medication when she has to take hers  AED/side effects/compliance:  Levetiracetam 1000mg twice a day  He manages his own medication  He uses a pill box, his mother helps remind him  Event/Seizure semiology:  1  Sporadic force head version to the left  (no facial twitching or involvement of the left arm)  2  Recurrent force head version to the left (tries to force his head back midline and say that he is okay), then secondary generalization  Prior Epilepsy History:  Intake History 2/15/2018  From [facility] during hospitalization 2018  Mr Deejay Holland is an adopted [place] male who had presented to hospital on 1/7/2018 with new onset seizure after playing video games  He was seen by Dr Carla Espostio from the neurology consult service  He reported having a semi-familiar feeling of his mind not being there, turned his head to the left, then he had a generalized convulsion  He woke up with EMT around him, feeling confused, slurring his speech  He had a second seizure while arriving at the ED  He reported that he had been having recurrent feelings of cannot control his mind and turned to the left  These events have become more frequent, about once every few weeks  He was subsequently started on Keppra 750mg twice a day  Patient's history:  He recalled his first seizure (age 23years old) on 1/7/2018; he noticed his head was turning to left and twitching, then he was shaking and fell down, (heard his friend called out "Oh my God")and unable to move the way he wanted to move, then he lost consciousness  About 30 minutes before he had an episode of forced head movement to the left  He denies a warning, feeling out of sorts, or jenny vu sensation    He had a second seizure about 90 minutes later, his parents witness that he had uncontrollable forced head movement to the left (he would say I'm okay) just before he had his second convulsion  He has had recurrent episodes of involuntary head turning to the left in the last 6-12 months, for a few seconds, there is no confusion or sensation of feeling spacey  Just before it happens he may have a moment of       Disorientation (but he is not sure if this is the correct sensation)  He has a history of ADHD and on the Asperger's with mood swings and anxiety  There is no worsening of his anxiety  The patient denies any history of myoclonus, staring spells, automatisms, unexplained hyperkinetic behaviors, olfactory / gustatory hallucinations, epigastric rising events, jenny vu events, visual hallucinations, unexplained nocturnal enuresis, or nocturnal tongue biting  Interval history 2018  -750  There were a couple of episodes of 1-2 seconds head twisting the to left, possibly 3 twitches, around   He may have forgotten a dose of his medication by a couple of days prior  There was no altered awareness, progression to loss of consciousness, or convulsion  He has had bouts of anger and mood swings even before starting levetiracetam   He was started on risperidone earlier in the year and Strattera in the last month  Special Features  Status epilepticus: No  Self Injury Seizures: No  Precipitating Factors: None    Epilepsy Risk Factors:  Unknown maternal history because he is adopted from [facility]  Abnormal birth/: born around 37 week, hyperbilirubinemia with light therapy  Abnormal Development: he was adopted around 9 months, maybe a late walker but there were more falls    He needed physical therapy for fine motor skills  Febrile seizures, simple: No  Febrile seizures, complex: No  CNS infection: No  Mental retardation: No  Cerebral palsy: No  Head injury (moderate/severe): No  CNS neoplasm: No  CNS malformation: No  Neurosurgical procedure: No  Stroke: No  Alcohol abuse: No  Drug abuse: No  Family history Sz/epilepsy: Unknown    Prior AEDs:  medication Max dose Time used Reason to stop   levetiracetam              Prior workup:  I have reviewed the MRI brain study myself; I showed the areas of heterotopic gray matter  Imagin2018   MRI brain  No acute pathology; temporal lobes are normal, symmetric hippocampi  Incidental 3-4 mm possible pituitary microadenoma    2018  MRI brain pituitary  Heterotopic gray matter in the left posterior lateral frontal lobe and in the right frontal lobe slightly more anteriorly  The left heterotopic gray matter extends to the surface of the left lateral vetnrical  Mild thickening of the left insula, possibly focal cortical dysplasia  Normal size of pituitary gland, no adenoma    EEGs:  2018  Normal awake and asleep EEG     3/1/2018  Normal awake and drowsy EEG    Labs:  2018  CBC 4 0 4/14 8/43/173  /4 /105/26/9/0 77/87  LFT 6 2/3 8/0 //64  Levetiracetam 25 5    2018  BUN 12  Cr 0 81  Levetiracetam level not done    Past Medical/Surgical History:  Past Medical History:   Diagnosis Date    ADHD     Anxiety     Asperger syndrome      History reviewed  No pertinent surgical history      Past Psychiatric History:  Depression: No  Anxiety: Yes, ADHD  Psychosis: No  He sees a psychiatrist for ADHD, his prior psychiatrist (Dr Sabi Washington thought that he was on the Spectrum)    Medications:    Current Outpatient Prescriptions:     atoMOXetine (STRATTERA) 40 mg capsule, Take 100 mg by mouth daily 1 tablet in the morning , Disp: , Rfl:     risperiDONE (RisperDAL) 1 mg tablet, Take 1 mg by mouth 2 (two) times a day, Disp: , Rfl: 2    VYVANSE 70 MG capsule, Take 70 mg by mouth every morning, Disp: , Rfl: 0    levETIRAcetam (KEPPRA XR) 500 MG 24 hr tablet, Take 4 tablets (2,000 mg total) by mouth daily, Disp: 360 tablet, Rfl: 1    Allergies:  No Known Allergies    Family history:  Family History Problem Relation Age of Onset    Adopted: Yes    Family history unknown: Yes       Social History  Living situation:  Lives with adopted parents  Work: He went to college for 1 year and struggled with attendance  Driving:  Yes   reports that he has never smoked  He has never used smokeless tobacco  He reports that he drinks alcohol  He reports that he does not use drugs  General exam   Blood pressure 100/70, pulse 101, height 5' 11" (1 803 m), weight 68 4 kg (150 lb 12 8 oz)  Exam is done today, unchanged from last office visit note  GENERAL:  normally developed person in no acute distress, atraumatic head  Eyes: Anicteric  Carotids: n/a  Heart: Regular rate and rhythm  Chest: clear to auscultation    MENTAL STATUS  Orientation: Alert and oriented x 3  Fund of knowledge: Intact  Attention/concentration: Intact  Recent/remote memory: intact  Language: speech is normal, no aphasia, comprehension intact    OPHTHALMOSCOPIC  Fundus/Optic discs/Posterior segments: n/a    CRANIAL NERVES  II: PERRL  III, IV, VI: Extraocular movements intact  No nystagmus  V: n/a  VII: Facial movements normal and symmetric  VIII: n/a  IX, X: no dysarthria  XI: Intact trapezius, SCM strength  XII: Tongue protrudes to the midline    MOTOR (Upper and lower extremities)   Bulk/tone/abnormal movement: Normal muscle bulk and tone  Drift: No pronator drift  Strength: Strength 5/5 throughout  COORDINATION   F/N: normal  FFM: normal  SUZI: normal  Station/Gait: Normal  gait  Normal tandem gait  SENSORY  Light touch: normal  Romberg sign absent  Reflexes:  n/a    Review of Systems  A review of at least 12 organ/systems was obtained by the medical assistant and reviewed by me, including additional positives/negatives:  A review of at least 12 organ/systems was evaluated and there are no complaints      Decision making was of high-complexity due to the patient's high risk condition (seizures), psychiatric and neuropsychological comorbidities, behavioral problems, memory and cognitive problems and medication side effects

## 2019-04-26 ENCOUNTER — OFFICE VISIT (OUTPATIENT)
Dept: NEUROLOGY | Facility: CLINIC | Age: 24
End: 2019-04-26
Payer: COMMERCIAL

## 2019-04-26 VITALS
BODY MASS INDEX: 21.14 KG/M2 | HEART RATE: 93 BPM | WEIGHT: 151 LBS | DIASTOLIC BLOOD PRESSURE: 56 MMHG | HEIGHT: 71 IN | SYSTOLIC BLOOD PRESSURE: 101 MMHG

## 2019-04-26 DIAGNOSIS — G40.209 LOCALIZATION-RELATED (FOCAL) (PARTIAL) SYMPTOMATIC EPILEPSY AND EPILEPTIC SYNDROMES WITH COMPLEX PARTIAL SEIZURES, NOT INTRACTABLE, WITHOUT STATUS EPILEPTICUS (HCC): Primary | ICD-10-CM

## 2019-04-26 DIAGNOSIS — G40.909 UNCLASSIFIED EPILEPTIC SEIZURES (HCC): ICD-10-CM

## 2019-04-26 PROCEDURE — 99214 OFFICE O/P EST MOD 30 MIN: CPT | Performed by: PSYCHIATRY & NEUROLOGY

## 2019-04-26 RX ORDER — LEVETIRACETAM 500 MG/1
2000 TABLET, EXTENDED RELEASE ORAL DAILY
Qty: 360 TABLET | Refills: 3 | Status: SHIPPED | OUTPATIENT
Start: 2019-04-26 | End: 2019-06-25 | Stop reason: SDUPTHER

## 2019-05-10 ENCOUNTER — TELEPHONE (OUTPATIENT)
Dept: NEUROLOGY | Facility: CLINIC | Age: 24
End: 2019-05-10

## 2019-06-12 DIAGNOSIS — G40.909 UNCLASSIFIED EPILEPTIC SEIZURES (HCC): ICD-10-CM

## 2019-06-12 RX ORDER — LEVETIRACETAM 500 MG/1
TABLET, EXTENDED RELEASE ORAL
Qty: 360 TABLET | Refills: 1 | OUTPATIENT
Start: 2019-06-12

## 2019-06-12 RX ORDER — LEVETIRACETAM 500 MG
2000 TABLET, EXTENDED RELEASE 24 HR ORAL DAILY
Qty: 120 TABLET | Refills: 0 | Status: SHIPPED | OUTPATIENT
Start: 2019-06-12 | End: 2019-06-25

## 2019-06-25 DIAGNOSIS — G40.909 UNCLASSIFIED EPILEPTIC SEIZURES (HCC): ICD-10-CM

## 2019-06-25 DIAGNOSIS — G40.209 LOCALIZATION-RELATED (FOCAL) (PARTIAL) SYMPTOMATIC EPILEPSY AND EPILEPTIC SYNDROMES WITH COMPLEX PARTIAL SEIZURES, NOT INTRACTABLE, WITHOUT STATUS EPILEPTICUS (HCC): ICD-10-CM

## 2019-06-25 RX ORDER — LEVETIRACETAM 500 MG/1
2000 TABLET, EXTENDED RELEASE ORAL DAILY
Qty: 120 TABLET | Refills: 0 | Status: SHIPPED | OUTPATIENT
Start: 2019-06-25 | End: 2019-10-14 | Stop reason: SDUPTHER

## 2019-10-13 NOTE — PROGRESS NOTES
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)  Patient has  comorbid ADHD and possible autism spectrum disorder  -- patient clinically stable,  No reported interim seizures  ---  Continues on levetiracetam extended release  500 mg 4 tablets at bedtime  -- last reported  Levetriacetam level noted at 17 3  Will update labs prior to next visit  -- discussed with patient seizure safety, as well as risk factors        No problem-specific Assessment & Plan notes found for this encounter  Diagnoses and all orders for this visit:    Unclassified epileptic seizures ([address] )  -     levETIRAcetam (KEPPRA XR) 500 MG 24 hr tablet; Take 4 tablets (2,000 mg total) by mouth daily    Localization-related (focal) (partial) symptomatic epilepsy and epileptic syndromes with complex partial seizures, not intractable, without status epilepticus (HCC)  -     levETIRAcetam (KEPPRA XR) 500 MG 24 hr tablet; Take 4 tablets (2,000 mg total) by mouth daily  -     Comprehensive metabolic panel; Future  -     CBC and differential; Future  -     Levetiracetam level; Future           Subjective:    COLIN Soto is a 61-year-old left-handed gentleman who presents today for neurologic follow-up for focal epilepsy  He was accompanied today by his mother     interval history  10/14/2019  LEV-ER 2000mg daily   patient denies any interim seizures since his last visit  He denies any head twitching, loss of awareness, disassociation  Denies any side effects from his Keppra, no issues with mood, or behavior  He continues on Strattera,Vyvanse from his psychiatrist   His  Risperidone all was discontinued  He does use Xanax prn for some anxiety       labs 05/02/2019; levetiracetam = 17      AED/side effects/compliance:  Levetiracetam ER 2000mg daily  He manages his own medication     Event/Seizure semiology:  1  Sporadic force head version to the left  (no facial twitching or involvement of the left arm)  2  Recurrent force head version to the left (tries to force his head back midline and say that he is okay), then secondary generalization      Prior Epilepsy History:  Intake History 2/15/2018  Mr Tamara Nieto is an adopted [place] male who had presented to hospital on 1/7/2018 with new onset seizure after playing video games  He reported having a semi-familiar feeling of his mind not being there, turned his head to the left, then he had a generalized convulsion  He woke up with EMT around him, feeling confused, slurring his speech  He had a second seizure while arriving at the ED  He reported that he had been having recurrent feelings of cannot control his mind and turned to the left  These events have become more frequent, about once every few weeks  He was subsequently started on Keppra 750mg twice a day      Patient's history:  He recalled his first seizure (age 23years old) on 1/7/2018; he noticed his head was turning to left and twitching, then he was shaking and fell down, (heard his friend called out "Oh my God")and unable to move the way he wanted to move, then he lost consciousness  About 30 minutes before he had an episode of forced head movement to the left  He denies a warning, feeling out of sorts, or jenny vu sensation  He had a second seizure about 90 minutes later, his parents witness that he had uncontrollable forced head movement to the left (he would say I'm okay) just before he had his second convulsion  He has had recurrent episodes of involuntary head turning to the left in the last 6-12 months, for a few seconds, there is no confusion or sensation of feeling spacey  Just before it happens he may have a moment of        Disorientation (but he is not sure if this is the correct sensation)      He has a history of ADHD and on the Asperger's with mood swings and anxiety      Summary 2018  Started at -750  Around 3/11/2019, couple of episodes of 1-2 seconds head twisting the to left, possibly 3 twitches  Increased LEV to 2661-9475  MRI brain study with dedicated pituitary study did not detect a pituitary adenoma  However, three areas with cortical dysplasia (left insular cortex) or heterotopic gray matter were found (left posterior lateral frontal, right anterior frontal)        He has had bouts of anger and mood swings even before starting levetiracetam   He is also on risperidone and Strattera        Special Features  Status epilepticus: No  Self Injury Seizures: No  Precipitating Factors: None     Epilepsy Risk Factors:  Unknown maternal history because he is adopted from [facility]  Abnormal birth/: born around 37 week, hyperbilirubinemia with light therapy  Abnormal Development: he was adopted around 9 months, maybe a late walker but there were more falls    He needed physical therapy for fine motor skills  Febrile seizures, simple: No  Febrile seizures, complex: No  CNS infection: No  Mental retardation: No  Cerebral palsy: No  Head injury (moderate/severe): No  CNS neoplasm: No  CNS malformation: No  Neurosurgical procedure: No  Stroke: No  Alcohol abuse: No  Drug abuse: No  Family history Sz/epilepsy: Unknown     Prior AEDs:  medication Max dose Time used Reason to stop   levetiracetam                      Prior workup:  x  Imagin/8/2018   MRI brain  No acute pathology; temporal lobes are normal, symmetric hippocampi  Incidental 3-4 mm possible pituitary microadenoma     2018  MRI brain pituitary  Heterotopic gray matter in the left posterior lateral frontal lobe and in the right frontal lobe slightly more anteriorly  The left heterotopic gray matter extends to the surface of the left lateral vetnrical  Mild thickening of the left insula, possibly focal cortical dysplasia  Normal size of pituitary gland, no adenoma     EEGs:  1/8/2018  Normal awake and asleep EEG     3/1/2018  Normal awake and drowsy EEG     Family history:  Family History   Adopted: Yes   Family history unknown: Yes      Social History  Living situation:  Lives with adopted parents  Work: He went to college for 1 year, works at [employer]:  Yes   reports that he has never smoked  He has never used smokeless tobacco  He reports that he drinks alcohol  He reports that he does not use drugs  The following portions of the patient's history were reviewed and updated as appropriate: allergies, current medications, past family history, past medical history, past social history, past surgical history and problem list          Objective:  Current Outpatient Medications   Medication Sig Dispense Refill    ALPRAZolam (XANAX) 0 25 mg tablet Take by mouth daily at bedtime as needed for anxiety      atomoxetine (STRATTERA) 100 MG capsule Take 100 mg by mouth daily 1 tablet in the morning       levETIRAcetam (KEPPRA XR) 500 MG 24 hr tablet Take 4 tablets (2,000 mg total) by mouth daily 360 tablet 2    VYVANSE 70 MG capsule Take 50 mg by mouth every morning   0    risperiDONE (RisperDAL) 1 mg tablet Take 1 mg by mouth 2 (two) times a day  2     No current facility-administered medications for this visit  Blood pressure 108/68, pulse 77, height 5' 11" (1 803 m), weight 64 kg (141 lb)  Physical Exam   Constitutional: He appears well-developed  HENT:   Head: Normocephalic  Eyes: Pupils are equal, round, and reactive to light  Neck: Normal range of motion  Cardiovascular: Normal rate and regular rhythm  Pulmonary/Chest: Effort normal    Musculoskeletal: Normal range of motion  Neurological: He has normal strength and normal reflexes  Coordination normal    Skin: Skin is warm     Psychiatric: His speech is normal and behavior is normal  Judgment and thought content normal  His mood appears anxious  Neurological Exam  Mental Status  Awake, alert and oriented to person, place and time  Recent and remote memory are intact  Speech is normal  Language is fluent with no aphasia  Attention and concentration are normal     Cranial Nerves  CN III, IV, VI: Extraocular movements intact bilaterally  Pupils equal round and reactive to light bilaterally  CN V: Facial sensation is normal   CN VII: Full and symmetric facial movement  CN VIII: Hearing is normal   CN XI: Shoulder shrug strength is normal     Motor  Normal muscle bulk throughout  Normal muscle tone  No abnormal involuntary movements  Strength is 5/5 throughout all four extremities  Sensory  Sensation is intact to light touch, pinprick, vibration and proprioception in all four extremities  Reports decreased temperature right upper extremity versus left, no myotonal pattern  Reflexes  Deep tendon reflexes are 2+ and symmetric in all four extremities with downgoing toes bilaterally  Coordination  Finger-to-nose, rapid alternating movements and heel-to-shin normal bilaterally without dysmetria  Gait  Normal casual, toe, heel and tandem gait  ROS:    Review of Systems  Constitutional: Negative  Negative for appetite change and fever  HENT: Negative  Negative for hearing loss, tinnitus, trouble swallowing and voice change  Eyes: Negative  Negative for photophobia and pain  Respiratory: Negative  Negative for shortness of breath  Cardiovascular: Negative  Negative for palpitations  Gastrointestinal: Negative  Negative for nausea and vomiting  Endocrine: Negative  Negative for cold intolerance and heat intolerance  Genitourinary: Negative  Negative for dysuria, frequency and urgency  Musculoskeletal: Negative  Negative for myalgias and neck pain  Skin: Negative  Negative for rash  Neurological: Negative    Negative for dizziness, tremors, seizures, syncope, facial asymmetry, speech difficulty, weakness, light-headedness, numbness and headaches  Hematological: Negative  Does not bruise/bleed easily  Psychiatric/Behavioral: Negative    Negative for confusion, hallucinations and sleep disturbance     ROS updated and personally reviewed with patient today's visit

## 2019-10-14 ENCOUNTER — OFFICE VISIT (OUTPATIENT)
Dept: NEUROLOGY | Facility: CLINIC | Age: 24
End: 2019-10-14
Payer: COMMERCIAL

## 2019-10-14 VITALS
HEART RATE: 77 BPM | HEIGHT: 71 IN | SYSTOLIC BLOOD PRESSURE: 108 MMHG | DIASTOLIC BLOOD PRESSURE: 68 MMHG | WEIGHT: 141 LBS | BODY MASS INDEX: 19.74 KG/M2

## 2019-10-14 DIAGNOSIS — G40.209 LOCALIZATION-RELATED (FOCAL) (PARTIAL) SYMPTOMATIC EPILEPSY AND EPILEPTIC SYNDROMES WITH COMPLEX PARTIAL SEIZURES, NOT INTRACTABLE, WITHOUT STATUS EPILEPTICUS (HCC): ICD-10-CM

## 2019-10-14 DIAGNOSIS — G40.909 UNCLASSIFIED EPILEPTIC SEIZURES (HCC): ICD-10-CM

## 2019-10-14 PROCEDURE — 99214 OFFICE O/P EST MOD 30 MIN: CPT | Performed by: NURSE PRACTITIONER

## 2019-10-14 RX ORDER — LEVETIRACETAM 500 MG/1
2000 TABLET, EXTENDED RELEASE ORAL DAILY
Qty: 360 TABLET | Refills: 2 | Status: SHIPPED | OUTPATIENT
Start: 2019-10-14 | End: 2020-04-15 | Stop reason: SDUPTHER

## 2019-10-14 RX ORDER — ALPRAZOLAM 0.25 MG/1
TABLET ORAL
COMMUNITY

## 2019-10-14 NOTE — PROGRESS NOTES
Patient ID: Rosebud Nageotte is a 27 y o  male  Assessment/Plan:    No problem-specific Assessment & Plan notes found for this encounter  {Assess/PlanSmartLinks:01495}       Subjective:    HPI    {[facility] HPI texts:10630}    {Common ambulatory SmartLinks:55450}         Objective:    Blood pressure 108/68, pulse 77, height 5' 11" (1 803 m), weight 64 kg (141 lb)  Physical Exam    Neurological Exam      ROS:    Review of Systems   Constitutional: Negative  Negative for appetite change and fever  HENT: Negative  Negative for hearing loss, tinnitus, trouble swallowing and voice change  Eyes: Negative  Negative for photophobia and pain  Respiratory: Negative  Negative for shortness of breath  Cardiovascular: Negative  Negative for palpitations  Gastrointestinal: Negative  Negative for nausea and vomiting  Endocrine: Negative  Negative for cold intolerance and heat intolerance  Genitourinary: Negative  Negative for dysuria, frequency and urgency  Musculoskeletal: Negative  Negative for myalgias and neck pain  Skin: Negative  Negative for rash  Neurological: Negative  Negative for dizziness, tremors, seizures, syncope, facial asymmetry, speech difficulty, weakness, light-headedness, numbness and headaches  Hematological: Negative  Does not bruise/bleed easily  Psychiatric/Behavioral: Negative  Negative for confusion, hallucinations and sleep disturbance

## 2019-10-14 NOTE — PATIENT INSTRUCTIONS
Patient clinically stable, no interim seizures  Continues on levetiracetam    patient call given any breakthrough events   will obtain updated labs prior to his next visit   continue to follow with psych Mercedes Flap Text: The defect edges were debeveled with a #15 scalpel blade.  Given the location of the defect, shape of the defect and the proximity to free margins a Mercedes flap was deemed most appropriate.  Using a sterile surgical marker, an appropriate advancement flap was drawn incorporating the defect and placing the expected incisions within the relaxed skin tension lines where possible. The area thus outlined was incised deep to adipose tissue with a #15 scalpel blade.  The skin margins were undermined to an appropriate distance in all directions utilizing iris scissors.

## 2020-04-15 ENCOUNTER — TELEMEDICINE (OUTPATIENT)
Dept: NEUROLOGY | Facility: CLINIC | Age: 25
End: 2020-04-15
Payer: COMMERCIAL

## 2020-04-15 DIAGNOSIS — G40.909 UNCLASSIFIED EPILEPTIC SEIZURES (HCC): ICD-10-CM

## 2020-04-15 DIAGNOSIS — G40.209 LOCALIZATION-RELATED (FOCAL) (PARTIAL) SYMPTOMATIC EPILEPSY AND EPILEPTIC SYNDROMES WITH COMPLEX PARTIAL SEIZURES, NOT INTRACTABLE, WITHOUT STATUS EPILEPTICUS (HCC): ICD-10-CM

## 2020-04-15 PROCEDURE — 99214 OFFICE O/P EST MOD 30 MIN: CPT | Performed by: NURSE PRACTITIONER

## 2020-04-15 RX ORDER — LEVETIRACETAM 500 MG/1
2000 TABLET, EXTENDED RELEASE ORAL DAILY
Qty: 360 TABLET | Refills: 2 | Status: SHIPPED | OUTPATIENT
Start: 2020-04-15 | End: 2020-10-14 | Stop reason: SDUPTHER

## 2020-08-03 ENCOUNTER — TELEPHONE (OUTPATIENT)
Dept: NEUROLOGY | Facility: CLINIC | Age: 25
End: 2020-08-03

## 2020-08-03 NOTE — TELEPHONE ENCOUNTER
People declined sooner appointment today, with Dr Ursula Cooper 8/3 at 10:55 20 minutes appointment

## 2020-10-14 ENCOUNTER — OFFICE VISIT (OUTPATIENT)
Dept: NEUROLOGY | Facility: CLINIC | Age: 25
End: 2020-10-14
Payer: COMMERCIAL

## 2020-10-14 VITALS
WEIGHT: 135 LBS | SYSTOLIC BLOOD PRESSURE: 119 MMHG | HEART RATE: 101 BPM | DIASTOLIC BLOOD PRESSURE: 62 MMHG | TEMPERATURE: 97.1 F | BODY MASS INDEX: 18.83 KG/M2

## 2020-10-14 DIAGNOSIS — F41.9 ANXIETY: ICD-10-CM

## 2020-10-14 DIAGNOSIS — G40.209 LOCALIZATION-RELATED (FOCAL) (PARTIAL) SYMPTOMATIC EPILEPSY AND EPILEPTIC SYNDROMES WITH COMPLEX PARTIAL SEIZURES, NOT INTRACTABLE, WITHOUT STATUS EPILEPTICUS (HCC): Primary | ICD-10-CM

## 2020-10-14 DIAGNOSIS — G40.909 UNCLASSIFIED EPILEPTIC SEIZURES (HCC): ICD-10-CM

## 2020-10-14 DIAGNOSIS — F90.9 ATTENTION DEFICIT HYPERACTIVITY DISORDER (ADHD), UNSPECIFIED ADHD TYPE: ICD-10-CM

## 2020-10-14 PROBLEM — J30.9 ALLERGIC RHINITIS: Status: ACTIVE | Noted: 2020-10-14

## 2020-10-14 PROCEDURE — 99214 OFFICE O/P EST MOD 30 MIN: CPT | Performed by: PSYCHIATRY & NEUROLOGY

## 2020-10-14 RX ORDER — LISDEXAMFETAMINE DIMESYLATE 50 MG
50 CAPSULE ORAL EVERY MORNING
COMMUNITY
Start: 2020-09-27

## 2020-10-14 RX ORDER — LEVETIRACETAM 500 MG/1
2000 TABLET, EXTENDED RELEASE ORAL DAILY
Qty: 360 TABLET | Refills: 3 | Status: SHIPPED | OUTPATIENT
Start: 2020-10-14 | End: 2021-07-21 | Stop reason: SDUPTHER

## 2021-02-23 ENCOUNTER — TELEPHONE (OUTPATIENT)
Dept: NEUROLOGY | Facility: CLINIC | Age: 26
End: 2021-02-23

## 2021-02-23 NOTE — TELEPHONE ENCOUNTER
First attempt, left voicemail for patient to call our office back (provided call back number) to reschedule his upcoming appointment with Dr Korey Hernández that needs to be rescheduled since he will not be in office that day  If patient calls back please schedule next available

## 2021-02-25 NOTE — TELEPHONE ENCOUNTER
Pt called to r/s his end Oct appt    R/s for  Wed 10/13/21  [address]  In [place]        Sent appt card

## 2021-04-15 DIAGNOSIS — Z23 ENCOUNTER FOR IMMUNIZATION: ICD-10-CM

## 2021-07-06 ENCOUNTER — TELEPHONE (OUTPATIENT)
Dept: NEUROLOGY | Facility: CLINIC | Age: 26
End: 2021-07-06

## 2021-07-06 NOTE — TELEPHONE ENCOUNTER
Received medical record request from [facility] faxed to [phone] and scanned in the chart

## 2021-07-21 DIAGNOSIS — G40.909 UNCLASSIFIED EPILEPTIC SEIZURES (HCC): ICD-10-CM

## 2021-07-21 DIAGNOSIS — G40.209 LOCALIZATION-RELATED (FOCAL) (PARTIAL) SYMPTOMATIC EPILEPSY AND EPILEPTIC SYNDROMES WITH COMPLEX PARTIAL SEIZURES, NOT INTRACTABLE, WITHOUT STATUS EPILEPTICUS (HCC): ICD-10-CM

## 2021-07-21 RX ORDER — LEVETIRACETAM 500 MG/1
2000 TABLET, EXTENDED RELEASE ORAL DAILY
Qty: 360 TABLET | Refills: 3 | Status: SHIPPED | OUTPATIENT
Start: 2021-07-21 | End: 2022-02-08 | Stop reason: SDUPTHER

## 2021-11-23 ENCOUNTER — TELEPHONE (OUTPATIENT)
Dept: NEUROLOGY | Facility: CLINIC | Age: 26
End: 2021-11-23

## 2021-11-29 ENCOUNTER — OFFICE VISIT (OUTPATIENT)
Dept: NEUROLOGY | Facility: CLINIC | Age: 26
End: 2021-11-29
Payer: COMMERCIAL

## 2021-11-29 VITALS
DIASTOLIC BLOOD PRESSURE: 57 MMHG | WEIGHT: 155 LBS | HEART RATE: 115 BPM | HEIGHT: 71 IN | SYSTOLIC BLOOD PRESSURE: 120 MMHG | BODY MASS INDEX: 21.7 KG/M2

## 2021-11-29 DIAGNOSIS — G40.209 LOCALIZATION-RELATED (FOCAL) (PARTIAL) SYMPTOMATIC EPILEPSY AND EPILEPTIC SYNDROMES WITH COMPLEX PARTIAL SEIZURES, NOT INTRACTABLE, WITHOUT STATUS EPILEPTICUS (HCC): Primary | ICD-10-CM

## 2021-11-29 DIAGNOSIS — G40.909 UNCLASSIFIED EPILEPTIC SEIZURES (HCC): ICD-10-CM

## 2021-11-29 DIAGNOSIS — F41.9 ANXIETY: ICD-10-CM

## 2021-11-29 PROCEDURE — 99213 OFFICE O/P EST LOW 20 MIN: CPT | Performed by: PSYCHIATRY & NEUROLOGY

## 2021-11-29 RX ORDER — ESCITALOPRAM OXALATE 20 MG/1
10 TABLET ORAL EVERY MORNING
COMMUNITY
Start: 2021-11-04

## 2021-12-07 ENCOUNTER — APPOINTMENT (OUTPATIENT)
Dept: LAB | Age: 26
End: 2021-12-07
Payer: COMMERCIAL

## 2021-12-07 DIAGNOSIS — G40.209 LOCALIZATION-RELATED (FOCAL) (PARTIAL) SYMPTOMATIC EPILEPSY AND EPILEPTIC SYNDROMES WITH COMPLEX PARTIAL SEIZURES, NOT INTRACTABLE, WITHOUT STATUS EPILEPTICUS (HCC): ICD-10-CM

## 2021-12-07 PROCEDURE — 36415 COLL VENOUS BLD VENIPUNCTURE: CPT

## 2021-12-07 PROCEDURE — 80177 DRUG SCRN QUAN LEVETIRACETAM: CPT

## 2021-12-10 ENCOUNTER — TELEPHONE (OUTPATIENT)
Dept: NEUROLOGY | Facility: CLINIC | Age: 26
End: 2021-12-10

## 2021-12-10 DIAGNOSIS — G40.209 LOCALIZATION-RELATED (FOCAL) (PARTIAL) SYMPTOMATIC EPILEPSY AND EPILEPTIC SYNDROMES WITH COMPLEX PARTIAL SEIZURES, NOT INTRACTABLE, WITHOUT STATUS EPILEPTICUS (HCC): Primary | ICD-10-CM

## 2021-12-10 LAB — LEVETIRACETAM SERPL-MCNC: 7.4 UG/ML (ref 10–40)

## 2021-12-14 PROBLEM — F90.0 ATTENTION-DEFICIT HYPERACTIVITY DISORDER, PREDOMINANTLY INATTENTIVE TYPE: Status: ACTIVE | Noted: 2018-01-08

## 2021-12-29 ENCOUNTER — APPOINTMENT (OUTPATIENT)
Dept: LAB | Age: 26
End: 2021-12-29
Payer: COMMERCIAL

## 2021-12-29 DIAGNOSIS — G40.209 LOCALIZATION-RELATED (FOCAL) (PARTIAL) SYMPTOMATIC EPILEPSY AND EPILEPTIC SYNDROMES WITH COMPLEX PARTIAL SEIZURES, NOT INTRACTABLE, WITHOUT STATUS EPILEPTICUS (HCC): ICD-10-CM

## 2021-12-29 PROCEDURE — 36415 COLL VENOUS BLD VENIPUNCTURE: CPT

## 2021-12-29 PROCEDURE — 80177 DRUG SCRN QUAN LEVETIRACETAM: CPT

## 2022-01-04 LAB — LEVETIRACETAM SERPL-MCNC: 28.7 UG/ML (ref 10–40)

## 2022-01-05 ENCOUNTER — IMMUNIZATIONS (OUTPATIENT)
Dept: FAMILY MEDICINE CLINIC | Facility: HOSPITAL | Age: 27
End: 2022-01-05

## 2022-01-05 DIAGNOSIS — Z23 ENCOUNTER FOR IMMUNIZATION: Primary | ICD-10-CM

## 2022-01-05 PROCEDURE — 91306 COVID-19 MODERNA VACC 0.25 ML BOOSTER: CPT

## 2022-01-05 PROCEDURE — 0064A COVID-19 MODERNA VACC 0.25 ML BOOSTER: CPT

## 2022-02-08 DIAGNOSIS — G40.209 LOCALIZATION-RELATED (FOCAL) (PARTIAL) SYMPTOMATIC EPILEPSY AND EPILEPTIC SYNDROMES WITH COMPLEX PARTIAL SEIZURES, NOT INTRACTABLE, WITHOUT STATUS EPILEPTICUS (HCC): ICD-10-CM

## 2022-02-08 DIAGNOSIS — G40.909 UNCLASSIFIED EPILEPTIC SEIZURES (HCC): ICD-10-CM

## 2022-02-08 RX ORDER — LEVETIRACETAM 500 MG/1
2000 TABLET, EXTENDED RELEASE ORAL DAILY
Qty: 60 TABLET | Refills: 10 | Status: SHIPPED | OUTPATIENT
Start: 2022-02-08 | End: 2022-04-26 | Stop reason: SDUPTHER

## 2022-02-08 NOTE — TELEPHONE ENCOUNTER
Patient requesting refill of keppra xr requesting a 15day supply as this is currently more cost effective for him  Requesting this sent to [facility]  Patient asking for a call once sent  Has 3 days of medications left

## 2022-02-08 NOTE — TELEPHONE ENCOUNTER
I'm confused  How is a 15 day supply more cost effective for him? Would 90 day be more cost effective? Does he need a prior authorization for Levetiracetam XR 500mg tab? Can you check with his pharmacy as to why 15 days supply would be more cost effective?

## 2022-02-08 NOTE — TELEPHONE ENCOUNTER
Patient is using [facility]  He is currently between insurances and is paying out of pocket for medication  Can only afford 15day supply at a time

## 2022-02-14 ENCOUNTER — TELEPHONE (OUTPATIENT)
Dept: NEUROLOGY | Facility: CLINIC | Age: 27
End: 2022-02-14

## 2022-02-14 NOTE — TELEPHONE ENCOUNTER
Patient of Dr Ramírez Macario, last office visit 11/29, office note documents:" continue Levetiacetam ER 500mg tab take 4 tabs daily"  He called to advise we will be getting a script request through [facility] to [facility] now that he has cobra (same insurance as prior)  We will address when received

## 2022-04-26 DIAGNOSIS — G40.209 LOCALIZATION-RELATED (FOCAL) (PARTIAL) SYMPTOMATIC EPILEPSY AND EPILEPTIC SYNDROMES WITH COMPLEX PARTIAL SEIZURES, NOT INTRACTABLE, WITHOUT STATUS EPILEPTICUS (HCC): ICD-10-CM

## 2022-04-26 DIAGNOSIS — G40.909 UNCLASSIFIED EPILEPTIC SEIZURES (HCC): ICD-10-CM

## 2022-04-26 RX ORDER — LEVETIRACETAM 500 MG/1
2000 TABLET, EXTENDED RELEASE ORAL DAILY
Qty: 360 TABLET | Refills: 3 | Status: SHIPPED | OUTPATIENT
Start: 2022-04-26 | End: 2022-06-17 | Stop reason: SDUPTHER

## 2022-04-26 NOTE — TELEPHONE ENCOUNTER
Please check with patient which home delivery service this medication is supposed to go to  He last mentioned that he may need his medications refilled at [facility]

## 2022-04-28 NOTE — TELEPHONE ENCOUNTER
Pt called asking if his keppra was sent to [facility]  Advised pt that script was sent to [facility] mailservice on 4/26/22  Can call [phone]  to schedule delivery   Pt verbalized understanding

## 2022-06-16 ENCOUNTER — TELEPHONE (OUTPATIENT)
Dept: NEUROLOGY | Facility: CLINIC | Age: 27
End: 2022-06-16

## 2022-06-16 NOTE — TELEPHONE ENCOUNTER
ADD-ON: 06/17/22 @ 12/45 PM with Michelle Flores in [facility]  [employer] is the insurance  Member I D: [id number]  Group Number: [id number]    Pt claimed that he needs a sooner appointment for possible focal sz

## 2022-06-17 ENCOUNTER — OFFICE VISIT (OUTPATIENT)
Dept: NEUROLOGY | Facility: CLINIC | Age: 27
End: 2022-06-17
Payer: COMMERCIAL

## 2022-06-17 VITALS
BODY MASS INDEX: 21.7 KG/M2 | HEIGHT: 71 IN | WEIGHT: 155 LBS | SYSTOLIC BLOOD PRESSURE: 106 MMHG | DIASTOLIC BLOOD PRESSURE: 64 MMHG

## 2022-06-17 DIAGNOSIS — R42 DIZZINESS: Primary | ICD-10-CM

## 2022-06-17 DIAGNOSIS — G40.209 LOCALIZATION-RELATED (FOCAL) (PARTIAL) SYMPTOMATIC EPILEPSY AND EPILEPTIC SYNDROMES WITH COMPLEX PARTIAL SEIZURES, NOT INTRACTABLE, WITHOUT STATUS EPILEPTICUS (HCC): ICD-10-CM

## 2022-06-17 PROCEDURE — 99215 OFFICE O/P EST HI 40 MIN: CPT | Performed by: NURSE PRACTITIONER

## 2022-06-17 RX ORDER — LEVETIRACETAM 500 MG/1
2500 TABLET, EXTENDED RELEASE ORAL DAILY
Qty: 450 TABLET | Refills: 3 | Status: SHIPPED | OUTPATIENT
Start: 2022-06-17

## 2022-06-17 NOTE — PROGRESS NOTES
Review of Systems   Constitutional: Negative  Negative for appetite change and fever  HENT: Negative  Negative for hearing loss, tinnitus, trouble swallowing and voice change  Eyes: Negative  Negative for photophobia and pain  Respiratory: Negative  Negative for shortness of breath  Cardiovascular: Negative  Negative for palpitations  Gastrointestinal: Negative  Negative for nausea and vomiting  Endocrine: Negative  Negative for cold intolerance  Genitourinary: Negative  Negative for dysuria, frequency and urgency  Musculoskeletal: Negative  Negative for myalgias and neck pain  Skin: Negative  Negative for rash  Neurological: Positive for seizures (started June 5th, patient had an Aura at work, another occured last week)  Negative for dizziness, tremors, syncope, facial asymmetry, speech difficulty, weakness, light-headedness, numbness and headaches  Hematological: Negative  Does not bruise/bleed easily  Psychiatric/Behavioral: Negative  Negative for confusion, hallucinations and sleep disturbance  All other systems reviewed and are negative

## 2022-06-17 NOTE — PATIENT INSTRUCTIONS
- Increase keppra to 2500 mg (5 tablets) daily   - Keep track of missed doses  - Call the office with further events  - Follow up in 4 months with Dr Ashlyn Feng  - Have blood work one week prior to seeing Dr Ashlyn Feng

## 2022-06-17 NOTE — PROGRESS NOTES
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, not intractable, without status epilepticus [facility])  Patient with localization related epilepsy  There have been two potential auras recently without secondary generalization  No loss of awareness or consciousness  There were neck symptoms (tingling) which he has experienced in the past without head version  The other episode is not as concerning for seizure and may be more related to dissociation given ADHD/ASD, non compliance with ADHD medications at times  He is currently on levetiracetam XR 2000 mg daily without notable side effects (unclear if irritability directly correlated with his use of levetiracetam)  Recommended increasing levetiracetam XR to 2500 mg daily and having labs checked in one week  Encouraged compliance with all prescribed medication  He will call the office with further episodes, consider EEG if characterization is unclear  Follow up in 4 months or sooner if needed  Dizziness  Reports dizziness when turning head quickly or changing position quickly  Quyen Hallpike negative in the office  Recommended increasing water intake  If this continues to be an issue he will call the office  Autism spectrum  Following with psychiatry  He will Return for 4 months with Dr Shelly Card        Subjective:  Duane Hatcher is a 24 y o  male with 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 and 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) who is returning for follow up of his seizures  He was last seen in the office by Dr Korey Hernández on 11/29/2021  He was seizure free and stable on levetiracetam  No worsening of anxiety or ADHD with levetiracetam use  Following with behavioral health specialists  Noted at that time that it would be reasonable that his refills be provided by PCP unless there were recurrent seizures or side effects  If PCP was unable to refill he would need yearly follow up  Patient called office yesterday to request an appointment due to a possible focal seizure  He has had a 1 or 2 auras in the last few weeks  None in May  Then on June 5th around 5 or 6 PM he was at the computer desk at work and he felt a feeling of detachment from reality and it was strange  He had to look away from the screen  His neck felt tingly but notes that nothing happened  He felt lightheaded, blotches in his eyes and had to look at the ground  That was at 504  About 10 minutes later he felt a drop in his stomach  He felt "fuzzy" for about 30- 40 minutes and then was fine  He had a much smaller one last week  He was about to leave work  He looked down and stared into the distance and it was weird  He felt slightly dazed but other that that, nothing  Was not as big as the first one  In the past 6 months since before he started his job he did not notice anything  His sleep schedule is all messed up  4pm-12 am is his work schedule  When he turns his head quickly he gets a weird feeling  Over the past year he can not do martial arts or parkour because of this feeling  He has missed doses of medication every once in a while or takes 3 instead of 4  There are some days when he may miss his medications for ADHD  Current seizure medications:  - levetiracetam XR 2000 mg daily  Other medications as per Epic  Latest Reference Range & Units 12/07/21 09:01 12/29/21 13:11   LEVETIRACETA (KEPPRA) 10 0 - 40 0 ug/mL 7 4 (L) [1] 28 7 [2]       Event/Seizure semiology:  1   Sporadic force head version to the left  (no facial twitching or involvement of the left arm)  2  Recurrent force head version to the left (tries to force his head back midline and say that he is okay), then secondary generalization  Special Features  Status epilepticus: No  Self Injury Seizures: No  Precipitating Factors: None     Epilepsy Risk Factors:  Unknown maternal history because he is adopted from [facility]  Abnormal birth/: born around 37 week, hyperbilirubinemia with light therapy  Abnormal Development: he was adopted around 9 months, maybe a late walker but there were more falls  He needed physical therapy for fine motor skills  Febrile seizures, simple: No  Febrile seizures, complex: No  CNS infection: No  Mental retardation: No  Cerebral palsy: No  Head injury (moderate/severe): No  CNS neoplasm: No  CNS malformation: No  Neurosurgical procedure: No  Stroke: No  Alcohol abuse: No  Drug abuse: No  Family history Sz/epilepsy: Unknown     Prior AEDs:  medication Max dose Time used Reason to stop   levetiracetam                      Prior workup:  x  Imagin/8/2018   MRI brain  No acute pathology; temporal lobes are normal, symmetric hippocampi  Incidental 3-4 mm possible pituitary microadenoma     2018  MRI brain pituitary  Heterotopic gray matter in the left posterior lateral frontal lobe and in the right frontal lobe slightly more anteriorly  The left heterotopic gray matter extends to the surface of the left lateral vetnrical  Mild thickening of the left insula, possibly focal cortical dysplasia  Normal size of pituitary gland, no adenoma     EEGs:  2018  Normal awake and asleep EEG     3/1/2018  Normal awake and drowsy EEG     Past Psychiatric History:  Depression: No  Anxiety: Yes, ADHD  Psychosis: No      His history was also obtained from his mother, who was present at today's visit        I reviewed prior neurology notes, recent labs, as documented in Epic/[facility], and summarized above  Objective:    Blood pressure 106/64, height 5' 11" (1 803 m), weight 70 3 kg (155 lb)  Physical Exam  No apparent distress  Appears well nourished  Mood appropriate for situation     Neurologic Exam  Mental status- alert and oriented to person, place, and time  Speech appropriate for conversation  Some difficulty keeping on track during conversation  Knowledgeable of medical situation  Cranial Nerves- PERRL, VFFTC, EOMS normal, facial sensation and muscles symmetric, hearing intact bilaterally to finger rubs, tongue midline, palate rise symmetrical, shoulder shrug symmetrical     Motor- No pronator drift  Appropriate strength  Moves all extremities freely  No tremor  Sensory-  Intact distally in all extremities to light touch  DTRs- 2+ and symmetric in all extremities    Gait- normal casual gait  Negative rhomberg  Quyen Hallpike negative  Coordination- FNF intact  ROS:    Review of Systems   Constitutional: Negative  Negative for appetite change and fever  HENT: Negative  Negative for hearing loss, tinnitus, trouble swallowing and voice change  Eyes: Negative  Negative for photophobia and pain  Respiratory: Negative  Negative for shortness of breath  Cardiovascular: Negative  Negative for palpitations  Gastrointestinal: Negative  Negative for nausea and vomiting  Endocrine: Negative  Negative for cold intolerance  Genitourinary: Negative  Negative for dysuria, frequency and urgency  Musculoskeletal: Negative  Negative for myalgias and neck pain  Skin: Negative  Negative for rash  Neurological: Positive for seizures (started June 5th, patient had an Aura at work, another occured last week)  Negative for dizziness, tremors, syncope, facial asymmetry, speech difficulty, weakness, light-headedness, numbness and headaches  Hematological: Negative  Does not bruise/bleed easily     Psychiatric/Behavioral: Negative  Negative for confusion, hallucinations and sleep disturbance  All other systems reviewed and are negative        ROS obtained by MA and reviewed by myself  This note may have been created using voice recognition software  There may be unintentional errors such as grammatical errors, spelling errors, or pronoun errors

## 2022-06-28 PROBLEM — R42 DIZZINESS: Status: ACTIVE | Noted: 2022-06-28

## 2022-06-28 NOTE — ASSESSMENT & PLAN NOTE
Patient with localization related epilepsy  There have been two potential auras recently without secondary generalization  No loss of awareness or consciousness  There were neck symptoms (tingling) which he has experienced in the past without head version  The other episode is not as concerning for seizure and may be more related to dissociation given ADHD/ASD, non compliance with ADHD medications at times  He is currently on levetiracetam XR 2000 mg daily without notable side effects (unclear if irritability directly correlated with his use of levetiracetam)  Recommended increasing levetiracetam XR to 2500 mg daily and having labs checked in one week  Encouraged compliance with all prescribed medication  He will call the office with further episodes, consider EEG if characterization is unclear  Follow up in 4 months or sooner if needed

## 2022-06-28 NOTE — ASSESSMENT & PLAN NOTE
Reports dizziness when turning head quickly or changing position quickly  Quyen ibarra in the office  Recommended increasing water intake  If this continues to be an issue he will call the office

## 2022-09-09 ENCOUNTER — TELEPHONE (OUTPATIENT)
Dept: NEUROLOGY | Facility: CLINIC | Age: 27
End: 2022-09-09

## 2022-10-11 ENCOUNTER — TELEPHONE (OUTPATIENT)
Dept: NEUROLOGY | Facility: CLINIC | Age: 27
End: 2022-10-11

## 2022-10-11 NOTE — TELEPHONE ENCOUNTER
Called and spoke to patient  Patient confirmed upcoming apt with Dr Johnathan Morales on 10/21/22 @ 9:30 am  Patient has no issues or concerns at this time

## 2022-10-20 NOTE — PROGRESS NOTES
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)  Initial seizure type involved forced head version to the left followed by bilateral tonic clonic activity  He had been without a recurrent seizure about 3 years on tolerated doses of levetiracetam   However, in the past few months there have been recurrent episodes of unusual sensation and/or impairment of speech  He denies that these have progressed to impaired awareness and he is fully aware of it happening without loss of consciousness  These are concerning to be focal aware type of seizures  Given the multifocal nature of his heterotopic gray matter it is possible for him to have more then one seizure type and onset focus  We can continue to escalate/maximize the dose of levetiracetam, however, we need to assess if there are new structural lesions to cause him to have a different seizure type  I request an updated EEG study, but if he continues to have these events/seizures, then an inpatient EMU study will become necessary to better characterize if these are seizures or if they are more of a behavioral phenomena related to his anxiety/ASD  Given that there have been no impaired awareness associated with these seizures/events, [facility]  He may continue to drive until these events progress to altered awareness/loss of consciousness      Plan:   1 - Increase Levetiacetam ER 500mg tab take 6 tabs daily  2 - routine EEG study  3 - MRI brain w/wo contrast  4 - blood work a day before MRI brain study (BMP, levetiracetam level)  5 - if you continue to experience focal seizures or focal impaired aware (confused disoriented out of it feeling), then will recommend inpatient admission to the epilepsy monitoring unit (EMU)  6 - call the office if you have recurrent focal motor seizures    7 - follow-up in 3 months with advanced practitioner  Try to keep a more regular sleep habit, no phone/electronic just before bedtime  Same bedtime and same waking up time every day  Problem List Items Addressed This Visit        Nervous and Auditory    Localization-related (focal) (partial) symptomatic epilepsy and epileptic syndromes with complex partial seizures, not intractable, without status epilepticus ([facility] [address] ) - Primary    Relevant Medications    levETIRAcetam (KEPPRA XR) 500 MG extended-release tablet    Other Relevant Orders    MRI brain seizure wo and w contrast    Levetiracetam level    Basic metabolic panel    EEG awake or drowsy routine          Chief Complaint:  Chief Complaint     Seizures         HPI:    Georgi Short is a 24 y o  left handed male here for follow-up evaluation of focal epilepsy  Interval history 10/21/2022  He had a visit on 6/17/2022 with Vandy Cogan due to a concern for a breakthrough seizure  He was having 1-2 auras then on 6/5/2022 he was working on his computer, feeling of detachment, felt strange, neck tingling, then lightheadedness  He felt fuzzy for the next 30-40 minutes  When he turns his head quickly he gets a weird feeling (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 of this feeling)  His Levetiracetam was increased to 2500mg daily  On October 11 and 12 was working at his desk, was standing   Week prior staying up late projects went to a concert  Oct 11 6:30 in the evening felt tired and cold, words weren't coming out so well  Speech came back after 5 minutes, tiredness lasted 30 min  Oct 12 7:43 pm speech slightly slurred, then feeling random confusion and anxiety   15 min later staring into space, Turn his head his head to the L happened twice for 7 seconds each  Rapid heart beat and then ended  After both these episodes felt tired  He was helping customers at the time (he took a couple of seconds to get his bearing)  He felt that his mind went blank for a few seconds  He believed that he was still in the moment  The customer and co-workers did not notice any abnormal behaviors  He was very much aware at the time  He works at [employer] at [employer], he was looking up something up on a computer for a guest   He started work at [employer] in January 2022  He works from [employer] to [employer], five days a week  He may not get a restful sleep  But his sleep is erratic  AED/side effects/compliance:  Levetiracetam ER 2500mg daily  Good compliance  Minor side effects, fatigue    Event/Seizure semiology:  1  Sporadic force head version to the left  (no facial twitching or involvement of the left arm)  2  Recurrent force head version to the left (tries to force his head back midline and say that he is okay), then secondary generalization  3  New seizure type (2022) - feeling of detachment, felt fuzzy/strange, poor word production, slurred speech, anxious, no altered awareness    Prior Epilepsy History:  Intake History 2/15/2018  From [facility] during hospitalization 2018  Mr Cele Jones is an adopted [place] male who had presented to hospital on 1/7/2018 with new onset seizure after playing video games  He was seen by Dr Argeins Goldman from the neurology consult service  He reported having a “semi-familiar” feeling of “his mind not being there”, turned his head to the left, then he had a generalized convulsion  He woke up with EMT around him, feeling confused, slurring his speech  He had a second seizure while arriving at the ED  He reported that he had been having recurrent feelings of “cannot control his mind” and turned to the left  These events have become more frequent, about once every few weeks    He was subsequently started on Keppra 750mg twice a day  Patient's history:  He recalled his first seizure (age 23years old) on 1/7/2018; he noticed his head was turning to left and twitching, then he was shaking and fell down, (heard his friend called out "Oh my God")and unable to move the way he wanted to move, then he lost consciousness  About 30 minutes before he had an episode of forced head movement to the left  He denies a warning, feeling out of sorts, or jenny vu sensation  He had a second seizure about 90 minutes later, his parents witness that he had uncontrollable forced head movement to the left (he would say I'm okay) just before he had his second convulsion  He has had recurrent episodes of involuntary head turning to the left in the last 6-12 months, for a few seconds, there is no confusion or sensation of feeling spacey  Just before it happens he may have a moment of       Disorientation (but he is not sure if this is the correct sensation)  He has a history of ADHD and on the Asperger's with mood swings and anxiety  Summary [id number]  Started at -750  Around 3/11/2019, couple of episodes of 1-2 seconds head twisting the to left, possibly 3 twitches  Increased LEV to [id number], eventually switched to LEV-ER 2000mg daily for better compliance  MRI brain study with dedicated pituitary study did not detect a pituitary adenoma  However, three areas with cortical dysplasia (left insular cortex) or heterotopic gray matter were found (left posterior lateral frontal, right anterior frontal)  He had bouts of anger and mood swings even before starting levetiracetam   He is doing well with Strattera, Vyvanse, and risperidone (prescribed by his psychiatrist)  He see Dr Melvin Pimentel of [facility] is managing his anxiety and ADHD  His Vyvanse dose did decrease over the past year, but he was also put on escitalopram about a year and a half        Special Features  Status epilepticus: No  Self Injury Seizures: No  Precipitating Factors: None    Epilepsy Risk Factors:  Unknown maternal history because he is adopted from [facility]  Abnormal birth/: born around 37 week, hyperbilirubinemia with light therapy  Abnormal Development: he was adopted around 9 months, maybe a late walker but there were more falls    He needed physical therapy for fine motor skills  Febrile seizures, simple: No  Febrile seizures, complex: No  CNS infection: No  Mental retardation: No  Cerebral palsy: No  Head injury (moderate/severe): No  CNS neoplasm: No  CNS malformation: No  Neurosurgical procedure: No  Stroke: No  Alcohol abuse: No  Drug abuse: No  Family history Sz/epilepsy: Unknown    Prior AEDs:  medication Max dose Time used Reason to stop   levetiracetam              Prior workup:  x  [id number]   MRI brain  No acute pathology; temporal lobes are normal, symmetric hippocampi  Incidental 3-4 mm possible pituitary microadenoma    2018  MRI brain pituitary  Heterotopic gray matter in the left posterior lateral frontal lobe and in the right frontal lobe slightly more anteriorly  The left heterotopic gray matter extends to the surface of the left lateral vetnrical  Mild thickening of the left insula, possibly focal cortical dysplasia  Normal size of pituitary gland, no adenoma    EEGs:  2018  Normal awake and asleep EEG     3/1/2018  Normal awake and drowsy EEG    Labs:  2022  /4 2/104/32/11/0   Levetiracetam 24 7    General exam   /69 (BP Location: Left arm, Patient Position: Sitting, Cuff Size: Standard)   Pulse 102   Temp (!) 97 1 °F (36 2 °C) (Tympanic)   Resp 18   Ht 5' 11" (1 803 m)   Wt 72 2 kg (159 lb 3 2 oz)   SpO2 96%   BMI 22 20 kg/m²    Appearance: normally developed, appears well  Carotids: not assessed  Cardiovascular: regular rate and rhythm and no murmur is present  Pulmonary: clear to auscultation  Extremities: no edema    HEENT: anicteric and neck is supple   Fundoscopy: not assessed    Mental status  Orientation: alert and oriented to name, place, time  Fund of Knowledge: intact   Attention and Concentration: intact  Current and Remote Memory:intact  Language: spontaneous speech is normal and comprehension is intact    Cranial Nerves  CN 1: not tested  CN 2: pupils equal round reactive to direct and consenual light   CN 3, 4, 6: EOMI, no nystagmus  CN 5:not assessed  CN 7:muscles of facial expression are symmetric  CN 8:not assessed  CN 9, 10:no dysarthria present  CN 11:symmetric SCM with head turns  CN 12:tongue is midline    Motor:  Bulk, Tone: normal bulk, normal tone  Pronation: no pronator drift  Strength: Symmetric strength of the arms and legs, no lateralizing weakness   Abnormal movements: no abnormal movements are present    Sensory:  Lighttouch: intact in all limbs  Romberg:not assessed    Coordination:  FNF:FNF bilaterally intact  SUZI:intact  FFM:intact  Gait/Station:normal gait    Reflexes:  not assessed      Past Medical/Surgical History:  Past Medical History:   Diagnosis Date   • ADHD    • Anxiety    • Asperger syndrome    • Unclassified epileptic seizures ([facility] [address] ) 2/15/2018     History reviewed  No pertinent surgical history      Past Psychiatric History:  Depression: No  Anxiety: Yes, ADHD  Psychosis: No      Medications:    Current Outpatient Medications:   •  atomoxetine (STRATTERA) 100 MG capsule, Take 100 mg by mouth daily 1 tablet in the morning , Disp: , Rfl:   •  escitalopram (LEXAPRO) 20 mg tablet, Take 10 mg by mouth every morning, Disp: , Rfl:   •  levETIRAcetam (KEPPRA XR) 500 MG extended-release tablet, Take 6 tablets (3,000 mg total) by mouth daily, Disp: 540 tablet, Rfl: 3  •  Vyvanse 50 MG capsule, Take 50 mg by mouth every morning  , Disp: , Rfl:   •  ALPRAZolam (XANAX) 0 25 mg tablet, Take by mouth daily at bedtime as needed for anxiety (Patient not taking: No sig reported), Disp: , Rfl:     Allergies:  No Known Allergies    Family history:  Family History   Adopted: Yes   Family history unknown: Yes     Social History  Living situation:  Lives with adopted parents  Work:  He student at [employer]; works [address] [employer]:  Yes   reports that he has never smoked  He has never used smokeless tobacco  He reports current alcohol use  He reports that he does not use drugs  Review of Systems  A review of at least 12 organ/systems was obtained by the medical assistant and reviewed by me, including additional positives/negatives:  Musculoskeletal: Positive for back pain (low back - slight)  Negative for gait problem, myalgias and neck pain  Constitutional: Positive for fatigue  Neurological: Positive for tremors (10/12/22 lasted 7 seconds), seizures (10-11-22 7:32 pm aura 10/12/22- 7:43 pm aura- sstaring and  speech issues), speech difficulty (10/11/22-10/12/22), light-headedness (10 days ago - lasted a few minutes and went away) and numbness  Negative for dizziness, syncope, facial asymmetry, weakness and headaches  Hematological: Negative  Does not bruise/bleed easily  Psychiatric/Behavioral: Positive for confusion  Negative for hallucinations and sleep disturbance  The patient is nervous/anxious  Anxiety on 10/12/22        Decision making was of high-complexity due to the patient's high risk condition (seizures), psychiatric and neuropsychological comorbidities, behavioral problems, memory and cognitive problems and medication side effects  The total amount of time spent with the patient along with pre-chart and post-chart preparation was 45 minutes on the calendar day of the date of service  This included history taking, physical exam, review of ancillary testing, counseling provided to the patient regarding diagnosis, medications, treatment, and risk management, and other communication to the patient's providers and/or family    Start time: 10:20AM  End time: 11:05AM

## 2022-10-21 ENCOUNTER — OFFICE VISIT (OUTPATIENT)
Dept: NEUROLOGY | Facility: CLINIC | Age: 27
End: 2022-10-21
Payer: COMMERCIAL

## 2022-10-21 VITALS
HEIGHT: 71 IN | OXYGEN SATURATION: 96 % | HEART RATE: 102 BPM | WEIGHT: 159.2 LBS | DIASTOLIC BLOOD PRESSURE: 69 MMHG | BODY MASS INDEX: 22.29 KG/M2 | RESPIRATION RATE: 18 BRPM | TEMPERATURE: 97.1 F | SYSTOLIC BLOOD PRESSURE: 131 MMHG

## 2022-10-21 DIAGNOSIS — G40.209 LOCALIZATION-RELATED (FOCAL) (PARTIAL) SYMPTOMATIC EPILEPSY AND EPILEPTIC SYNDROMES WITH COMPLEX PARTIAL SEIZURES, NOT INTRACTABLE, WITHOUT STATUS EPILEPTICUS (HCC): Primary | ICD-10-CM

## 2022-10-21 PROCEDURE — 99215 OFFICE O/P EST HI 40 MIN: CPT | Performed by: PSYCHIATRY & NEUROLOGY

## 2022-10-21 RX ORDER — LEVETIRACETAM 500 MG/1
3000 TABLET, EXTENDED RELEASE ORAL DAILY
Qty: 540 TABLET | Refills: 3 | Status: SHIPPED | OUTPATIENT
Start: 2022-10-21

## 2022-10-21 NOTE — PATIENT INSTRUCTIONS
Plan:   1 - Increase Levetiacetam ER 500mg tab take 6 tabs daily  2 - routine EEG study  3 - MRI brain w/wo contrast  4 - blood work a day before MRI brain study (BMP, levetiracetam level)  5 - if you continue to experience focal seizures or focal impaired aware (confused disoriented out of it feeling), then will recommend inpatient admission to the epilepsy monitoring unit (EMU)  6 - call the office if you have recurrent focal motor seizures    7 - follow-up in 3 months with advanced practitioner  Try to keep a more regular sleep habit, no phone/electronic just before bedtime  Same bedtime and same waking up time every day  Video Electroencephalopathy (VEEG) and the Epilepsy Monitoring Unit (EMU)    What is video EEG? In video-EEG, you are video taped at the same time as your brain waves are recorded  This typically occurs in the hospital over a long period of time, often an average of 3-7 days  The doctor is able to review both the video and EEG at the same time to see exactly what your brain waves are doing while watching what your body is doing  Why do I need video-EEG? Typically the reasons to have a video-EEG study are to make a diagnosis, classify the type of seizures and epilepsy, and if medications do not prevent the seizures then offer an alternative therapy (pre-surgical evaluation)  Some people have events that look like an epileptic seizure (caused by electrical storm of the brain); but are in fact not due to abnormal electrical activity in the brain  Other causes include cardiac or vascular pathology, other neurological causes such as tics and movement disorders, or psychological / psychogenic nonepileptic events  There are also many different kinds of epileptic seizures  The video EEG will help classify the seizures and epilepsy syndrome to determine the best medications or treatments     Nearly a third of epilepsy patients are medically refractory (failed 2 or more appropriate anti-seizure medications) and epilepsy surgery may be an option for these patients  VEEG is used to evaluate where seizures start in the brain, determine if surgery may be possible and guide the specific surgical approach for patients who are found to be surgical candidates  What to expect in the Epilepsy Monitoring Unit (EMU)  The EMU is a specialized inpatient unit in the [facility] specially designed for evaluation of seizure disorders  The unit is equipped with computer-based monitoring equipment, certified EEG technologists, trained nurses, and attending physicians trained in the management of epilepsy  In the EMU, seizures are recorded and specially reviewed so that a proper diagnosis can be made and treatment can be optimized  Each patient will have a private room and a private bathroom  Patients will be connected to video-EEG equipment continuously (24 hours a day)  There is no video recording while in the restroom, but brain waves are recorded on the EEG at all times  Although this can be bothersome, continuous monitoring at all times is necessary to make sure patients are safe and that the necessary information is collected  Because patients are connected to recording equipment, mobility is restricted to the patient’s room  Patients are not be able to take a shower or wash your hair while on EEG monitoring  This would interfere with your EEG electrodes  Washing with a basin or sink is permitted  Patients are asked to activate a push button when they experience an event and then state aloud what they are experiencing  Nurses will test the patient during an event to help the doctors see what is occurring  To increase the chance of capturing a seizure in a limited amount of time a variety of “activation procedures” are employed    Sleep deprivation (no naps during the day and attempts to keep you awake all night as often as every other night)  Photic stimulation and hyperventilation procedures  Weaning or withdrawal of medications used to control seizures (the attending physician will discuss this with you)  You will need to have a peripheral IV placed in your hand or arm  This allows the doctors to give “rescue” medications in the event of a medical emergency while you are in the hospital  You also may be given a blood thinner in the form of daily subcutaneous injections to prevent deep venous thrombosis (DVT)  [facility] [place] is a non-smoking facility, therefore smoking is not allowed  Chewing gum is not allowed, this creates artifact on the EEG  If you are a woman of childbearing age, you may be asked to take a urine pregnancy test   We strive to make the EMU as safe as possible  Throughout the world video EEG monitoring is the standard of care and thousands of patients have been admitted to epilepsy monitoring units and there are rare cases of complications due to severely difficult to control epilepsy  These have included seizure clusters, status epilepticus (seizures that do not stop with typical medications and advanced measures are required), injuries (fractures and head injury), and very rarely death  How long do patients stay in the EMU? The length of time varies for each patient  Prepare to stay about 1 week (even though it may not last that long)  Typically, patients stay between 3-7 days, but may stay more or less time in special circumstances  Things you can do to prepare for admission  Take a shower and wash your hair the night before or the morning of admission  Do not use any hair products such as gels, sprays, mousse, or hair weaves  It is NOT necessary to cut your hair or shave your head for the test    Unless you received specific instructions from your physician, continue to take your medications as you normally do, including the morning of your admission    Bring all of your current medications in the original prescription bottles to the hospital (some specialty medications may not be available in the hospital)  Bring a complete list of your medical and surgical history  Bring your seizure calendar  Wear comfortable clothing or pajamas  Button-down shirts and elastic-waist pants are preferred  You can bring slippers or sneakers for when you are walking around the room  You can bring activities like computers, phones, cards, music, movies, etc  with you to keep you occupied  Electronic devices cannot be plugged in while you are touching them (this interferes with the equipment), but can be charging on the other side of the room     If you need any medical devices (such as a CPAP for obstructive sleep apnea), please bring it to the hospital

## 2022-10-21 NOTE — PROGRESS NOTES
Review of Systems   Constitutional: Positive for fatigue  Negative for appetite change and fever  HENT: Negative  Negative for hearing loss, tinnitus, trouble swallowing and voice change  Eyes: Negative  Negative for photophobia, pain and visual disturbance  Respiratory: Negative  Negative for shortness of breath  Cardiovascular: Negative  Negative for palpitations  Gastrointestinal: Negative  Negative for nausea and vomiting  Endocrine: Negative  Negative for cold intolerance  Genitourinary: Negative for dysuria and frequency  Musculoskeletal: Positive for back pain (low back - slight)  Negative for gait problem, myalgias and neck pain  Skin: Negative  Negative for rash  Allergic/Immunologic: Negative  Neurological: Positive for tremors (10/12/22 lasted 7 seconds), seizures (10-11-22 7:32 pm aura 10/12/22- 7:43 pm aura- sstaring and  speech issues), speech difficulty (10/11/22-10/12/22), light-headedness (10 days ago - lasted a few minutes and went away) and numbness  Negative for dizziness, syncope, facial asymmetry, weakness and headaches  Hematological: Negative  Does not bruise/bleed easily  Psychiatric/Behavioral: Positive for confusion  Negative for hallucinations and sleep disturbance  The patient is nervous/anxious           Anxiety on 10/12/22

## 2022-12-09 ENCOUNTER — HOSPITAL ENCOUNTER (OUTPATIENT)
Dept: RADIOLOGY | Age: 27
Discharge: HOME | End: 2022-12-09

## 2022-12-09 ENCOUNTER — HOSPITAL ENCOUNTER (OUTPATIENT)
Dept: NEUROLOGY | Facility: CLINIC | Age: 27
End: 2022-12-09

## 2022-12-09 DIAGNOSIS — G40.209 LOCALIZATION-RELATED (FOCAL) (PARTIAL) SYMPTOMATIC EPILEPSY AND EPILEPTIC SYNDROMES WITH COMPLEX PARTIAL SEIZURES, NOT INTRACTABLE, WITHOUT STATUS EPILEPTICUS (HCC): ICD-10-CM

## 2022-12-09 RX ADMIN — GADOBUTROL 7 ML: 604.72 INJECTION INTRAVENOUS at 09:16

## 2022-12-13 ENCOUNTER — TELEPHONE (OUTPATIENT)
Dept: NEUROLOGY | Facility: CLINIC | Age: 27
End: 2022-12-13

## 2022-12-13 NOTE — TELEPHONE ENCOUNTER
----- Message from Chelle Trejo MD sent at 12/12/2022  1:10 PM EST -----  Routine EEG study is normal; has he had any further episodes of impaired speech or auras? Levetiracetam level is 13, but we increased his dose to Levetiracetam ER 3000mg once a day  Does he take his medication in the morning or bedtime? Level is low so we have to consider if he has been non-adherent to his medication; how often does he forget to take his medication?

## 2022-12-13 NOTE — TELEPHONE ENCOUNTER
Spoke to patient  No further episodes of impaired speech or auras  Medication is taken in the morning  States over the past 4 weeks he missed maybe 2 doses  Patient also notes when he had mistakenly been taking 7 tablets of levetiracetam he had a "bad reaction" - light headed, dizzy, unable to function, extreme lethargy, felt impaired  Does not feel that increasing dose would be a good idea

## 2022-12-16 NOTE — TELEPHONE ENCOUNTER
Spoke to patient  Review MRI results with him  Verbalized understanding  Will notify office if any seizure activity or concerns

## 2022-12-16 NOTE — TELEPHONE ENCOUNTER
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) which can also cause a different type of seizures     MRI brain study was obtained due to concern about new type of seizures that caused speech impairment and confusion   This could be from the areas described with too many neurons   If symptoms or more seizures then will need to consider long term EEG monitoring study to better define where his seizures are coming from  If he has more seizure like symptoms will consider adding a different antiseizure medication, considering that he does not tolerate higher dose of levetiracetam     If no further symptoms for now will hold off on video EEG study

## 2023-01-31 ENCOUNTER — TELEPHONE (OUTPATIENT)
Dept: NEUROLOGY | Facility: CLINIC | Age: 28
End: 2023-01-31

## 2023-01-31 NOTE — TELEPHONE ENCOUNTER
Called and spoke to patient  Patient confirmed upcoming apt with Dr Александр Carlisle on 02/17/23 @ 9:30 am  Patient informed to complete his labs  Appt card mailed out with physician's name, location, date and time of appt

## 2023-02-17 ENCOUNTER — OFFICE VISIT (OUTPATIENT)
Dept: NEUROLOGY | Facility: CLINIC | Age: 28
End: 2023-02-17

## 2023-02-17 VITALS
HEART RATE: 98 BPM | WEIGHT: 161.4 LBS | RESPIRATION RATE: 18 BRPM | SYSTOLIC BLOOD PRESSURE: 117 MMHG | OXYGEN SATURATION: 97 % | HEIGHT: 71 IN | BODY MASS INDEX: 22.6 KG/M2 | DIASTOLIC BLOOD PRESSURE: 68 MMHG | TEMPERATURE: 97.3 F

## 2023-02-17 DIAGNOSIS — G40.209 LOCALIZATION-RELATED (FOCAL) (PARTIAL) SYMPTOMATIC EPILEPSY AND EPILEPTIC SYNDROMES WITH COMPLEX PARTIAL SEIZURES, NOT INTRACTABLE, WITHOUT STATUS EPILEPTICUS (HCC): Primary | ICD-10-CM

## 2023-02-17 DIAGNOSIS — F41.9 ANXIETY: ICD-10-CM

## 2023-02-17 DIAGNOSIS — Q04.8 GRAY MATTER HETEROTOPIA (HCC): ICD-10-CM

## 2023-02-17 DIAGNOSIS — F84.0 AUTISM SPECTRUM: ICD-10-CM

## 2023-02-17 PROBLEM — R42 DIZZINESS: Status: RESOLVED | Noted: 2022-06-28 | Resolved: 2023-02-17

## 2023-02-17 NOTE — PATIENT INSTRUCTIONS
Plan:   1 - Continue with Levetiacetam ER 500mg tab take 6 tabs daily  2 - if you continue to experience episodes of confusion, cognitive difficulty, speech impairment, focal seizures, then will recommend inpatient admission to the epilepsy monitoring unit (EMU) to better characterize these symptoms  3 - alternative option is to add on another antiseizure medication such as lamotrigine (lamictal)  4 - check levetiracetam level prior to next visit  5 - follow-up in 6 months with advanced practitioner  6 - call the office if you have recurrent seizures  7 - consider checking out [facility]ile[facility] program   [email or web address]    I discussed seizure safety and seizure first aid with the patient  Limits would be no unprotected heights (ladders, standing on chairs/tables), should not swim alone (need partners or ), cooking with a partner to avoid burn injuries, showers instead of baths  I reviewed that convulsive seizures typically last about 2 minutes with about 15-30 minutes of recovery  Should a seizure last more than 5 minutes or patient fails to recover after 30 minutes or there are multiple seizures in a day or if there is a suspected head injury then EMS should be called or they go to the nearest emergency room  If he only experiences altered awareness, confusion, or focal seizures, then they may call the office for guidance  Seizure first aid consists of preventing the patient from wandering or removal of dangerous objects or prevention of injury, for convulsive seizures, position the patient on the ground, may place a pillow under the head, turn the patient to his side, no objects or reaching for the mouth, no restraints but prevent injuries by removing glasses or sharp/heavy objects, and time the duration of the seizure  I recommend that he obtain a medical alert bracelet that states "Seizure disorder" or "Epilepsy" along with any medication allergies      We discussed issues related to driving  I explained to the patient that the law states that all patients who have a seizure must be reported to the [place]  Patients cannot legally drive for 6 months after seizure in the state of [place] (6 months in the state of [place] and 3 months in the state of [place] )  He is not cleared to return to driving until he has been without a seizure for at least 6 months and cleared by the appropriate state DMV/DOT  I also informed the patient that, although exceptions can be granted for patients with provoked seizures, exclusively nocturnal seizures, prolonged auras, or exclusively simple partial seizures, these exceptions are granted by the [place] and not by the physician  In [place], if the patient is driving with a suspended license, it is a summary offense with a $200 fine and may also involve license suspension to be increased by 1-2 years  In [place], if the patient is driving with a suspended license, it may carry a fine of $500-$1000, with the potential for imprisonment and increase of license suspension up to 6 additional months  SEIZURE SAFETY    Don’t let fear of seizures keep you at home  Be smart about your activities to make sure you are safe  The guidelines below can help you be as safe as possible  Make sure the people around you are aware of: What happens when you have a seizure  Correct seizure first aid  First aid for choking (consider taking a basic life support class)  When they should know to call 911 or for medical help    Avoid common triggers of seizures:  Missing your medications  Not getting enough sleep  Drinking alcohol  Using illegal drugs    Safety measures for at home:  In the bathroom:   Do not take baths in the bathtub  Instead, take only showers  Try to have a family member available while you are in the shower  Make sure the drain in the bathtub/shower is working properly to avoid pooling of water    You can consider a fitted shower seat  Recessed soap trays can minimize injury if you would happen to fall in the shower  Bathroom doors can be hung to open outwards, so that the door can still be opened if you fall against the door  Do not lock the bathroom door  Use an “Occupied” sign on the door or other signal to let others know you are in the bathroom  Safety locks can be obtained from the [facility]  On your water heater, set your water temperature to a warm temperature that is not scalding to avoid burns from very hot water  Put non-skid strips/ in the bathtub  Use an electric shaver  Avoid any electrical appliances (including electric shaver) in the bathroom or near water  Use shatterproof glass for mirrors  In the kitchen:   When possible, cook using a microwave  Only cook or use electrical appliances when someone else is in the house and available  As much as possible, grill food and avoid fryers or frying food  Use the back burners of the stove and turn the pot handles toward the back of the stove  Avoid carrying hot pans, pots of boiling water, or very hot food  Serve food or liquids directly from the stove  At the least, minimize the distance hot food is carried  Use precut foods or food processers to limit the need to use knives  Use plastic or durable cups, dishes, and containers instead of breakable glass items  In the bedroom  Low level and wide beds (like a futon) can reduce risk of injury of falling out of bed  If there is a high risk of falling out of bed, you can consider simply putting the mattress on the floor  Avoid sleeping on top bunks of bunk beds  Place a soft carpet on the floor  Around the house  Pad sharp corners of tables, chairs, etc  Round tables and furniture can be considered to avoid sharp corners  Avoid open flames  Place a screen in front of fireplaces and don’t build a fire alone  Allow for open spaces with furniture    Avoid loose throw rugs or slippery floors  Non-slip анна or cushioned анна can help reduce injury from a fall  Fireproof fabrics and furniture are best, and especially important if you smoke  Doors and windows with safety glass are safer if someone falls against them  Avoid lights and heaters that could easily be knocked over  Use curling irons or clothing irons that have automatic shut off switches  Make sure motor driven equipment or lawn mowers have “dead man’s” handles or seats so they will turn off if you release pressure  Safety measures when away from home  [facility] law mandates that you cannot drive for 6 months after your most recent seizure  [place] law mandates that you cannot drive for 6 months after your most recent seizure  Work/Travel  Wear a medical alert bracelet or necklace at all times  Wear a helmet and use protective clothing/equipment when appropriate  Consider telling co-workers and travel companions that you have epilepsy and what to do if you have a seizure  Avoid irregular shifts or disruptions of a regular sleep pattern  Take your medications on time and keep an updated list of medications in your purse or wallet  Do not climb to heights or operate heavy machinery  Stand back from the edges of roads or bus/train platforms when traveling  If you wander when you have a seizure, take a friend along for the trip  Recreation  Swimming can be dangerous  Do not swim alone, in open water, or in murky water that you cannot see the bottom  Caregivers should be with you in the pool at all times and must be physically able to get you out of the water  Use a flotation device  Scuba diving is not recommended since during a seizure people are unaware of their surroundings  Having a seizure underwater can be deadly and can endanger the lives of others    Kayaking and canoeing can be especially dangerous  People with epilepsy are at a higher risk of becoming trapped underneath a canoe or kayak  Wear a helmet when playing contact sports, biking, or if there is a risk of falling  Patients with epilepsy are at a higher risk of head injury when playing contact sports  Avoid riding a bike, running, or other activities around busy roads, steep hills, or secluded areas  Exercise on soft surfaces  Theme Mac: many people with epilepsy can go on rides depending on their type of seizures  For some people, stress or excitement can trigger a seizure  Rollercoasters (especially if you are upside-down) are more dangerous for people with epilepsy  Medications  Take your medications on time  If you have trouble remembering, set alarms on your phone  You can visit www  pppdrtl9ghgafjf  com to set up reminders through text message to help you remember to take your medications  Use a pillbox to help you keep track of your medications  When out of the house, take any needed medications with you  Consider keeping one or two extra doses with you in case you are unexpectedly away from home longer than planned  If you realize you missed a dose of your medications and it is less than 2 hours until your next dose, skip the missed dose  Do not double up your medication dose  If it is more than 2 hours until your next dose, you can take the missed dose  Avoid drinking alcohol, since this can enhance effects of your seizure medications  Be aware of common and major side effects of your medications  Keep your medications out of the reach of children  Parenting:  Childproof your home as much as possible  It is possible that you could drop your baby if you have a seizure while holding or feeding them  If you are nursing a baby, sit on the floor or bed with your back supported so the baby will not fall far if you should lose consciousness  Feed the baby while he or she is seated in an infant seat  Dress, change, and sponge bathe the baby on the floor    Move the baby around in a stroller or small crib  Keep a young baby in a playpen when you are alone, and a toddler in an indoor play yard, or childproof one room and use safety dumont at the doors  When out of the house, use a bungee-type cord or restraint harness so your child cannot wander away if you have a seizure that affects your awareness

## 2023-02-17 NOTE — PROGRESS NOTES
Review of Systems   Constitutional: Negative  Negative for appetite change and fever  HENT: Negative  Negative for hearing loss, tinnitus, trouble swallowing and voice change  Eyes: Negative  Negative for photophobia, pain and visual disturbance  Respiratory: Negative  Negative for shortness of breath  Cardiovascular: Negative  Negative for palpitations  Gastrointestinal: Negative  Negative for nausea and vomiting  Endocrine: Negative  Negative for cold intolerance  Genitourinary: Negative  Negative for dysuria, frequency and urgency  Musculoskeletal: Positive for back pain (low back pain and right knee pain)  Negative for gait problem, myalgias and neck pain  Skin: Negative  Negative for rash  Allergic/Immunologic: Negative  Neurological: Negative  Negative for dizziness, tremors, seizures, syncope, facial asymmetry, speech difficulty, weakness, light-headedness, numbness and headaches  Hematological: Negative  Does not bruise/bleed easily  Psychiatric/Behavioral: Negative for confusion, hallucinations and sleep disturbance  The patient is nervous/anxious           Anxiety

## 2023-02-17 NOTE — PROGRESS NOTES
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)  His initial seizures back in 2018 involved head version to the left and bilateral tonic clonic activity, which may suggest involvement of the right anterior frontal heterotopic gray matter  He had been without focal motor seizures for many years on levetiracetam   About 5-6 months ago, he described a new symptom of detachment and impaired speech, which does not localize very well  His multiple brain developmental malformation suggest the possibility of multifocal epilepsy  Symptoms he described could also be nonepileptic cognitive issues or stress  He is currently tolerating higher dose of levetiracetam without worsening anxiety or mood symptoms  So far no recurrence of previously mentioned symptoms  If he was to have non-localizing symptoms again (cognitive or detachedment), I would recommend an inpatient epilepsy monitoring unit (EMU) study to better characterize these events  If these are seizures then he will need another antiepileptic medication  If these events are more cognitive or stress then he will need a referral to a therapist   I gave him information about the [facility] as a resource for him to understand the overlap of epilepsy, anxiety, stress, and cognitive issues for people with epilepsy  With regards to people with epilepsy, he should be physically active but avoid any situation that can result in a terrible fall (such as parkour) or drowning (have a swimming partner available)      Plan:   1 - Continue with Levetiacetam ER 500mg tab take 6 tabs daily  2 - if you continue to experience episodes of confusion, cognitive difficulty, speech impairment, focal seizures, then will recommend inpatient admission to the epilepsy monitoring unit (EMU) to better characterize these symptoms  3 - alternative option is to add on another antiseizure medication such as lamotrigine (lamictal)  4 - check levetiracetam level prior to next visit  5 - follow-up in 6 months with advanced practitioner  6 - call the office if you have recurrent seizures  7 - consider checking out [facility] program   [email or web address]      Problem List Items Addressed This Visit        Nervous and Auditory    Localization-related (focal) (partial) symptomatic epilepsy and epileptic syndromes with complex partial seizures, not intractable, without status epilepticus ([facility] [address] ) - Primary    Relevant Orders    Levetiracetam level    Lima Garg matter [facility])       Other    Anxiety    Autism spectrum       Chief Complaint:  Chief Complaint    Seizures        HPI:    Shekhar Olivas is a [place] 41 y o  left handed male here for follow-up evaluation of focal epilepsy  Interval history 2/17/2023  There has been no recurrence of confusion, difficulty with words, or sensation of detach  He says that he has an "edge" to his thoughts  He feels that he is more prone to speaking his mind  He reports that he has the usual stress, anger issues, but nothing out of control  There have been no episode of focal motor or head pulling sensation  AED/side effects/compliance:  Levetiracetam ER 3000mg daily  He has anger but not any worse    Event/Seizure semiology:  1  Sporadic force head version to the left  (no facial twitching or involvement of the left arm)  2  Recurrent force head version to the left (tries to force his head back midline and say that he is okay), then secondary generalization    3  New seizure type (2022) - feeling of detachment, felt fuzzy/strange, poor word production, slurred speech, anxious, no altered awareness    Prior Epilepsy History:  Intake History 2/15/2018  From Neurology consultants during hospitalization 2018  Mr Miladis Jaimes is an adopted [place] male who had presented to hospital on 1/7/2018 with new onset seizure after playing video games  He was seen by Dr Logan Gamino from the neurology consult service  He reported having a “semi-familiar” feeling of “his mind not being there”, turned his head to the left, then he had a generalized convulsion  He woke up with EMT around him, feeling confused, slurring his speech  He had a second seizure while arriving at the ED  He reported that he had been having recurrent feelings of “cannot control his mind” and turned to the left  These events have become more frequent, about once every few weeks  He was subsequently started on Keppra 750mg twice a day  Patient's history:  He recalled his first seizure (age 23years old) on 1/7/2018; he noticed his head was turning to left and twitching, then he was shaking and fell down, (heard his friend called out "Oh my God")and unable to move the way he wanted to move, then he lost consciousness  About 30 minutes before he had an episode of forced head movement to the left  He denies a warning, feeling out of sorts, or jenny vu sensation  He had a second seizure about 90 minutes later, his parents witness that he had uncontrollable forced head movement to the left (he would say I'm okay) just before he had his second convulsion  He has had recurrent episodes of involuntary head turning to the left in the last 6-12 months, for a few seconds, there is no confusion or sensation of feeling spacey  Just before it happens he may have a moment of       Disorientation (but he is not sure if this is the correct sensation)  He has a history of ADHD and on the Asperger's with mood swings and anxiety  Summary [id number]  Started at -750    Around 3/11/2019, couple of episodes of 1-2 seconds head twisting the to left, possibly 3 twitches  Increased LEV to [id number], eventually switched to LEV-ER 2000mg daily for better compliance  MRI brain study with dedicated pituitary study did not detect a pituitary adenoma  However, three areas with cortical dysplasia (left insular cortex) or heterotopic gray matter were found (left posterior lateral frontal, right anterior frontal)  He had bouts of anger and mood swings even before starting levetiracetam   He is doing well with Strattera, Vyvanse, and risperidone (prescribed by his psychiatrist)  He see Dr Samuel Dsouza of [facility] is managing his anxiety and ADHD  His Vyvanse dose did decrease over the past year, but he was also put on escitalopram about a year and a half  Interval history 10/21/2022  LEV-ER 2500  He was having 1-2 auras then on 2022 he was working on his computer, feeling of detachment, felt strange, neck tingling, then lightheadedness  He felt fuzzy for the next 30-40 minutes  When he turns his head quickly he gets a weird feeling (he stopped doing [employer] arts and [employer] because of this feeling)  His Levetiracetam was increased to 2500mg daily  Oct 11 6:30 in the evening felt tired and cold, words weren't coming out so well  Speech came back after 5 minutes, tiredness lasted 30 min  Oct 12 7:43 pm speech slightly slurred, then feeling random confusion and anxiety  15 min later staring into space, Turn his head his head to the left happened twice for 7 seconds each  He felt that his mind went blank for a few seconds  He believed that he was still in the moment  The customer and co-workers did not notice any abnormal behaviors  He was very much aware at the time          Special Features  Status epilepticus: No  Self Injury Seizures: No  Precipitating Factors: None    Epilepsy Risk Factors:  Unknown maternal history because he is adopted from [facility]  Abnormal birth/: born around 44 week, hyperbilirubinemia with light therapy  Abnormal Development: he was adopted around 9 months, maybe a late walker but there were more falls    He needed physical therapy for fine motor skills  Febrile seizures, simple: No  Febrile seizures, complex: No  CNS infection: No  Mental retardation: No  Cerebral palsy: No  Head injury (moderate/severe): No  CNS neoplasm: No  CNS malformation: No  Neurosurgical procedure: No  Stroke: No  Alcohol abuse: No  Drug abuse: No  Family history Sz/epilepsy: Unknown    Prior AEDs:  medication Time used Reason to stop   levetiracetam -now           Prior workup:  x  Imagin2018 - MRI brain  No acute pathology; temporal lobes are normal, symmetric hippocampi  Incidental 3-4 mm possible pituitary microadenoma    2018 - MRI brain pituitary  Heterotopic gray matter in the left posterior lateral frontal lobe and in the right frontal lobe slightly more anteriorly  The left heterotopic gray matter extends to the surface of the left lateral vetnrical  Mild thickening of the left insula, possibly focal cortical dysplasia  Normal size of pituitary gland, no adenoma    2022 - MRI brain w/wo  There are heterotopic gray matter in the bilateral posterior frontal regions (on the right, heterotopia involves the middle frontal gyrus) and bilateral posterior insular region (polymicrogyria on sagittal sequence)    EEGs:  2018 - Normal awake and asleep EEG   3/1/2018 - Normal awake and drowsy EEG  2022 - normal awake and drowsy EEG    Labs:  2022  /4 1/107/29/13/0   Levetiracetam 13 0    General exam   /68 (BP Location: Right arm, Patient Position: Sitting, Cuff Size: Standard)   Pulse 98   Temp (!) 97 3 °F (36 3 °C) (Tympanic)   Resp 18   Ht 5' 11" (1 803 m)   Wt 73 2 kg (161 lb 6 4 oz)   SpO2 97%   BMI 22 51 kg/m²    Appearance: normally developed, appears well  Carotids: not assessed  Cardiovascular: regular rate and rhythm and no murmur is present  Pulmonary: clear to auscultation  Extremities: no edema    HEENT: anicteric and moist mucus membranes / oral cavity   Fundoscopy: not assessed    Mental status  Orientation: alert and oriented to name, place, time  Fund of Knowledge: intact   Attention and Concentration: intact  Current and Remote Memory:intact  Language: spontaneous speech is normal and comprehension is intact    Cranial Nerves  CN 1: not tested  CN 2: pupils equal round reactive to direct and consenual light   CN 3, 4, 6: EOMI, no nystagmus  CN 5:sensation intact to all distribution V1, V2, V3  CN 7:muscles of facial expression are symmetric  CN 8:not assessed  CN 9, 10:no dysarthria present  CN 11:symmetric shoulder shrug  CN 12:tongue is midline    Motor:  Bulk, Tone: normal bulk, normal tone  Pronation: no pronator drift  Strength: Symmetric strength of the arms and legs, no lateralizing weakness   Abnormal movements: no abnormal movements are present    Sensory:  Lighttouch: intact in all limbs  Romberg:not assessed    Coordination:  FNF:FNF bilaterally intact  SUZI:intact  FFM:intact  Gait/Station:normal gait    Reflexes:  Bilateral tricep, biceps, brachioradialis, knees, and ankles 2+/4      Past Medical/Surgical History:  Past Medical History:   Diagnosis Date   • ADHD    • Anxiety    • Asperger syndrome    • Unclassified epileptic seizures ([facility] [address] ) 2/15/2018     History reviewed  No pertinent surgical history      Past Psychiatric History:  Depression: No  Anxiety: Yes, ADHD  Psychosis: No      Medications:    Current Outpatient Medications:   •  escitalopram (LEXAPRO) 20 mg tablet, Take 5 mg by mouth every morning, Disp: , Rfl:   •  levETIRAcetam (KEPPRA XR) 500 MG extended-release tablet, Take 6 tablets (3,000 mg total) by mouth daily, Disp: 540 tablet, Rfl: 3  •  Vyvanse 50 MG capsule, Take 50 mg by mouth every morning  , Disp: , Rfl:     Allergies:  No Known Allergies    Family history:  Family History   Adopted: Yes   Family history unknown: Yes     Social History  Living situation:  Lives with adopted parents  Work:  He student at [employer]; works [employer]:  Yes   reports that he has never smoked  He has never used smokeless tobacco  He reports current alcohol use  He reports that he does not use drugs  Review of Systems  A review of at least 12 organ/systems was obtained by the medical assistant and reviewed by me, including additional positives/negatives:  Musculoskeletal: Positive for back pain (low back pain and right knee pain)  Negative for gait problem, myalgias and neck pain  Psychiatric/Behavioral: Negative for confusion, hallucinations and sleep disturbance  The patient is nervous/anxious  Anxiety     Decision making was of high-complexity due to the patient's high risk condition (seizures), psychiatric and neuropsychological comorbidities, behavioral problems, memory and cognitive problems and medication side effects  The total amount of time spent with the patient along with pre-chart and post-chart preparation was 41 minutes on the calendar day of the date of service  This included history taking, physical exam, review of ancillary testing, counseling provided to the patient regarding diagnosis, medications, treatment, and risk management, and other communication to the patient's providers and/or family    Start time: 09:45AM  End time: 10:26AM

## 2023-04-27 DIAGNOSIS — G40.209 LOCALIZATION-RELATED (FOCAL) (PARTIAL) SYMPTOMATIC EPILEPSY AND EPILEPTIC SYNDROMES WITH COMPLEX PARTIAL SEIZURES, NOT INTRACTABLE, WITHOUT STATUS EPILEPTICUS (HCC): ICD-10-CM

## 2023-04-27 RX ORDER — LEVETIRACETAM 500 MG/1
3000 TABLET, EXTENDED RELEASE ORAL DAILY
Qty: 540 TABLET | Refills: 1 | Status: SHIPPED | OUTPATIENT
Start: 2023-04-27

## 2023-08-17 ENCOUNTER — OFFICE VISIT (OUTPATIENT)
Dept: NEUROLOGY | Facility: CLINIC | Age: 28
End: 2023-08-17

## 2023-08-17 VITALS
SYSTOLIC BLOOD PRESSURE: 125 MMHG | WEIGHT: 159 LBS | HEART RATE: 108 BPM | DIASTOLIC BLOOD PRESSURE: 58 MMHG | HEIGHT: 71 IN | BODY MASS INDEX: 22.26 KG/M2

## 2023-08-17 DIAGNOSIS — G40.209 LOCALIZATION-RELATED (FOCAL) (PARTIAL) SYMPTOMATIC EPILEPSY AND EPILEPTIC SYNDROMES WITH COMPLEX PARTIAL SEIZURES, NOT INTRACTABLE, WITHOUT STATUS EPILEPTICUS (HCC): ICD-10-CM

## 2023-08-17 RX ORDER — LEVETIRACETAM 500 MG/1
3500 TABLET, EXTENDED RELEASE ORAL DAILY
Qty: 630 TABLET | Refills: 3 | Status: SHIPPED | OUTPATIENT
Start: 2023-08-17

## 2023-08-17 NOTE — PATIENT INSTRUCTIONS
- Increase levetiracetam (keppra) to 3500 mg daily (7 pills per day)  - Call the office with further events concerning for seizures  - Follow up in 3 months with Dr Mary Lou Goel

## 2023-08-17 NOTE — PROGRESS NOTES
Patient ID: Kaci Welch is a 34 y.o. 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 There was a recent event concerning for focal aware seizure. No loss of awareness or other associated symptoms other than feeling twitchy and his head turning to the left. He does report feeling a little strange after. His parents did not notice what was going on but they did ask if he was okay when he was taking deep breaths and relaxing himself. This has occurred in the past prior to larger seizures. He does notice this symptoms as seizure precursor hence he reported it. We discussed his driving today and he reports that if this were to occur while he was driving he would having time to quickly pull over and turn the vehicle off in case there was secondary generalization. There have been no further events of feeling detached. He is agreeable to increasing levetiracetam XR to 3500 mg daily. If he continues to have events concerning for seizure, may have to consider a second AED. He will call the office with further episodes, consider EEG if characterization is unclear. Follow up in 3 months or sooner if needed with Dr Homar Strong. He will Return in about 3 months (around 11/17/2023) for Follow up with Dr Homar Strong .       Subjective:  aKci Welch is a 34 y.o. 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 He was last seen in the office by Dr Adalid Zhou 2/17/23. At that time, he was without focal motor seizures for many years on levetiracetam. About 5-6 months prior he did describe a new symptoms of attachment and impaired speech which does not localize well. Given his multiple brain developmental malformations, he may be at risk for multifocal epilepsy but symptoms could also be nonepileptic cognitive issues or stress but denied any recurrence of these events at that time. No changes were made to AED regimen at that time but noted that if episodes were to return/continue would recommend EMU admission for characterization of events. Recommended follow up in 6 months or sooner if needed. Since his last visit, he is back on lexapro. A few weeks ago he was coming off of it and he started feeling twitchy and his head started to go to the left so he took some deep breaths and relaxed himself and he is not sure if it was a seizure or panic attack as the last time he had a seizure he felt that this mind went blank. He was with his parents and they didn't notice what was going on. Afterwards he was feeling a little strange and this went away. No further events of feeling unattached or difficulty with speech. He does feel anxious and was active all day which is likely why his heart rate is elevated. He is working full time at the [employer], is back in school, and is writing a book. Current seizure medications:  - levetiracetam XR 3000 mg daily  Other medications as per Epic. Levetiracetam level 7/26/23 12.1, 12/7/22 13.0    Event/Seizure semiology:  1. Sporadic force head version to the left  (no facial twitching or involvement of the left arm). 2. Recurrent force head version to the left (tries to force his head back midline and say that he is okay), then secondary generalization. 3. New seizure type () - feeling of detachment, felt fuzzy/strange, poor word production, slurred speech, anxious, no altered awareness    Special Features  Status epilepticus: No  Self Injury Seizures: No  Precipitating Factors: None     Epilepsy Risk Factors:  Unknown maternal history because he is adopted from [place]  Abnormal birth/: born around 37 week, hyperbilirubinemia with light therapy  Abnormal Development: he was adopted around 9 months, maybe a late walker but there were more falls.   He needed physical therapy for fine motor skills  Febrile seizures, simple: No  Febrile seizures, complex: No  CNS infection: No  Mental retardation: No  Cerebral palsy: No  Head injury (moderate/severe): No  CNS neoplasm: No  CNS malformation: No  Neurosurgical procedure: No  Stroke: No  Alcohol abuse: No  Drug abuse: No  Family history Sz/epilepsy: Unknown     Prior AEDs:  medication Time used Reason to stop   levetiracetam -now                Prior workup:  x  Imagin/8/2018 - MRI brain  No acute pathology; temporal lobes are normal, symmetric hippocampi  Incidental 3-4 mm possible pituitary microadenoma     2018 - MRI brain pituitary  Heterotopic gray matter in the left posterior lateral frontal lobe and in the right frontal lobe slightly more anteriorly  The left heterotopic gray matter extends to the surface of the left lateral vetnrical  Mild thickening of the left insula, possibly focal cortical dysplasia  Normal size of pituitary gland, no adenoma     2022 - MRI brain w/wo  There are heterotopic gray matter in the bilateral posterior frontal regions (on the right, heterotopia involves the middle frontal gyrus) and bilateral posterior insular region (polymicrogyria on sagittal sequence)     EEGs:  2018 - Normal awake and asleep EEG.  3/1/2018 - Normal awake and drowsy EEG  12/9/2022 - normal awake and drowsy EEG      I reviewed prior neurology notes, most recent labs, as documented in Epic[facility], and summarized above. Objective:    Blood pressure 125/58, pulse (!) 108, height 5' 11" (1.803 m), weight 72.1 kg (159 lb). Physical Exam  No apparent distress. Appears well nourished. Mood appropriate for situation     Neurologic Exam  Mental status- alert and oriented to person, place, and time. Speech appropriate for conversation. Good attention and knowledge. Cranial Nerves- PERRL, VFFTC, EOMS normal, facial sensation and muscles symmetric, hearing intact bilaterally to finger rubs, tongue midline, palate rise symmetrical, shoulder shrug symmetrical.    Motor- No pronator drift. Appropriate strength. Moves all extremities freely. No tremor. Sensory-  Intact distally in all extremities to light touch. DTRs- 2+ and symmetric in all extremities. Gait- normal casual gait. Normal tandem gait. Negative rhomberg. Coordination- FNF intact. ROS:  Review of Systems   Constitutional: Negative for appetite change, fatigue and fever. HENT: Negative. Negative for hearing loss, tinnitus, trouble swallowing and voice change. Eyes: Negative. Negative for photophobia, pain and visual disturbance. Respiratory: Negative. Negative for shortness of breath. Cardiovascular: Negative. Negative for palpitations. Gastrointestinal: Negative. Negative for nausea and vomiting. Endocrine: Negative. Negative for cold intolerance. Genitourinary: Negative. Negative for dysuria, frequency and urgency. Musculoskeletal: Negative for back pain, gait problem, myalgias and neck pain. Skin: Negative. Negative for rash. Allergic/Immunologic: Negative. Neurological: Negative.   Negative for dizziness, tremors, seizures, syncope, facial asymmetry, speech difficulty, weakness, light-headedness, numbness and headaches. Hematological: Negative. Does not bruise/bleed easily. Psychiatric/Behavioral: Negative. Negative for confusion, hallucinations and sleep disturbance. All other systems reviewed and are negative. ROS obtained by MA and reviewed by myself. This note may have been created using voice recognition software. There may be unintentional errors such as grammatical errors, spelling errors, or pronoun errors.

## 2023-08-22 NOTE — ASSESSMENT & PLAN NOTE
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 There was a recent event concerning for focal aware seizure. No loss of awareness or other associated symptoms other than feeling twitchy and his head turning to the left. He does report feeling a little strange after. His parents did not notice what was going on but they did ask if he was okay when he was taking deep breaths and relaxing himself. This has occurred in the past prior to larger seizures. He does notice this symptoms as seizure precursor hence he reported it. We discussed his driving today and he reports that if this were to occur while he was driving he would having time to quickly pull over and turn the vehicle off in case there was secondary generalization. There have been no further events of feeling detached. He is agreeable to increasing levetiracetam XR to 3500 mg daily. If he continues to have events concerning for seizure, may have to consider a second AED. He will call the office with further episodes, consider EEG if characterization is unclear. Follow up in 3 months or sooner if needed with Dr Ashkan Robins.

## 2023-10-07 ENCOUNTER — HOSPITAL ENCOUNTER (EMERGENCY)
Facility: HOSPITAL | Age: 28
Discharge: HOME | End: 2023-10-07
Attending: EMERGENCY MEDICINE
Payer: COMMERCIAL

## 2023-10-07 VITALS
TEMPERATURE: 97.7 F | RESPIRATION RATE: 18 BRPM | SYSTOLIC BLOOD PRESSURE: 125 MMHG | DIASTOLIC BLOOD PRESSURE: 64 MMHG | OXYGEN SATURATION: 100 % | HEART RATE: 70 BPM

## 2023-10-07 DIAGNOSIS — F32.A DEPRESSION: Primary | ICD-10-CM

## 2023-10-07 PROCEDURE — 99284 EMERGENCY DEPT VISIT MOD MDM: CPT

## 2023-10-07 PROCEDURE — 99284 EMERGENCY DEPT VISIT MOD MDM: CPT | Performed by: EMERGENCY MEDICINE

## 2023-10-07 NOTE — Clinical Note
Afshan Kellogg was seen and treated in our emergency department on 10/7/2023. Diagnosis:     Kip Frances  . He may return on this date: 10/08/2023         If you have any questions or concerns, please don't hesitate to call.       Winsome Branch MD    ______________________________           _______________          _______________  [facility] Representative                              Date                                Time

## 2023-10-08 NOTE — DISCHARGE INSTRUCTIONS
Please follow up with your therapist and outpatient psychiatrist for further management of your depression and anxiety. You were not suicidal here and declined hospitalization. Please return if you develop suicidal thoughts again for help.

## 2023-10-08 NOTE — ED PROVIDER NOTES
History  Chief Complaint   Patient presents with   • Depression     Pt coming from work. Printed a poem out at work and someone found it and suggested he should be evaluated for depression. Pt denies SI, HI. Reports feeling stressed     70-year-old man with relevant PMH asperberger's, anxiety, and ADHD presents with depression. He works at the [employer] in [employer]. he was having some increased stressors today and wrote a poem on the computer to help alleviate his stress. He printed out the poem, but it went to a different printer than he expected. A coworker read the poem and was concerned and recommended he come in for evaluation. He continues to endorse the poem was a way to help process his thoughts. He does endorse stress at work and at home but reports support through his family and friends. He does see a therapist outside the hospital. He is denying SI and HI at this time. He has never been hospitalize for mental health. Prior to Admission Medications   Prescriptions Last Dose Informant Patient Reported? Taking? Vyvanse 50 MG capsule   Yes No   Sig: Take 50 mg by mouth every morning     escitalopram (LEXAPRO) 20 mg tablet   Yes No   Sig: Take 10 mg by mouth every morning   levETIRAcetam (KEPPRA XR) 500 MG extended-release tablet   No No   Sig: Take 7 tablets (3,500 mg total) by mouth daily      Facility-Administered Medications: None       Past Medical History:   Diagnosis Date   • ADHD    • Anxiety    • Asperger syndrome    • Unclassified epileptic seizures ([address]) 2/15/2018       History reviewed. No pertinent surgical history. Family History   Adopted: Yes   Family history unknown: Yes     I have reviewed and agree with the history as documented. E-Cigarette/Vaping     E-Cigarette/Vaping Substances     Social History     Tobacco Use   • Smoking status: Never   • Smokeless tobacco: Never   Substance Use Topics   • Alcohol use: Yes     Comment: rare   • Drug use:  No Review of Systems   Constitutional: Negative for chills and fever. HENT: Negative for ear pain and sore throat. Eyes: Negative for pain and visual disturbance. Respiratory: Negative for cough and shortness of breath. Cardiovascular: Negative for chest pain and palpitations. Gastrointestinal: Negative for abdominal pain, constipation, diarrhea and vomiting. Genitourinary: Negative for dysuria and hematuria. Musculoskeletal: Negative for arthralgias and back pain. Skin: Negative for color change and rash. Neurological: Negative for seizures, syncope and light-headedness. Psychiatric/Behavioral: Positive for suicidal ideas. Negative for agitation and confusion. Physical Exam  ED Triage Vitals   Temperature Pulse Respirations Blood Pressure SpO2   10/07/23 2133 10/07/23 2132 10/07/23 2132 10/07/23 2132 10/07/23 2132   97.7 °F (36.5 °C) 70 18 125/64 100 %      Temp Source Heart Rate Source Patient Position - Orthostatic VS BP Location FiO2 (%)   10/07/23 2133 10/07/23 2132 10/07/23 2132 10/07/23 2132 --   Axillary Monitor Lying Right arm       Pain Score       --                    Orthostatic Vital Signs  Vitals:    10/07/23 2132   BP: 125/64   Pulse: 70   Patient Position - Orthostatic VS: Lying       Physical Exam  Vitals and nursing note reviewed. Constitutional:       General: He is not in acute distress. Appearance: Normal appearance. He is well-developed. HENT:      Head: Normocephalic and atraumatic. Right Ear: External ear normal.      Left Ear: External ear normal.   Cardiovascular:      Rate and Rhythm: Normal rate and regular rhythm. Pulmonary:      Effort: Pulmonary effort is normal. No respiratory distress. Breath sounds: Normal breath sounds. Abdominal:      Palpations: Abdomen is soft. Tenderness: There is no abdominal tenderness. There is no guarding or rebound. Musculoskeletal:         General: Normal range of motion.       Cervical back: Normal range of motion and neck supple. Skin:     General: Skin is warm and dry. Neurological:      Mental Status: He is alert and oriented to person, place, and time. Mental status is at baseline. Psychiatric:         Attention and Perception: Attention and perception normal.         Mood and Affect: Mood and affect normal.         Speech: Speech normal.         Behavior: Behavior normal. Behavior is cooperative. Thought Content: Thought content normal. Thought content does not include homicidal or suicidal ideation. Cognition and Memory: Cognition and memory normal.         Judgment: Judgment normal. Judgment is not impulsive or inappropriate. ED Medications  Medications - No data to display    Diagnostic Studies  Results Reviewed     None                 No orders to display         Procedures  Procedures      ED Course           Medical Decision Making  Presents for passive SI. He does have a history on and off passive SI. He has never had a suicide attempt. He endorses a moment of increased depression at that time leading him to write the palm as a way of relieving his stress. He is not interested in signing himself in for mental health hospitalization at this time. He does see a therapist regularly. I do not see any grounds for it. 2S he is not suicidal homicidal and his actions earlier with the problem seem reasonable coping mechanisms for his mental health. Patient in agreement with plan and questions were answered. Verbalized understanding of return precautions. Portions or all of this note were generated using voice recognition software. Occasional wrong word or "sound a like" substitutions may have occurred due to the inherent limitations of voice recognition software. Please interpret any errors within the intended context of the whole sentence or idea.           Disposition  Final diagnoses:   Depression     Time reflects when diagnosis was documented in both MDM as applicable and the Disposition within this note     Time User Action Codes Description Comment    10/7/2023 10:56 PM Lima Brown Add [L76. A] Depression       ED Disposition     ED Disposition   Discharge    Condition   Stable    Date/Time   Sat Oct 7, 2023 10:56 PM    Comment   Diego Albertn discharge to home/self care. Follow-up Information    None         Discharge Medication List as of 10/7/2023 10:57 PM      CONTINUE these medications which have NOT CHANGED    Details   escitalopram (LEXAPRO) 20 mg tablet Take 10 mg by mouth every morning, Starting Thu 11/4/2021, Historical Med      levETIRAcetam (KEPPRA XR) 500 MG extended-release tablet Take 7 tablets (3,500 mg total) by mouth daily, Starting Thu 8/17/2023, Normal      Vyvanse 50 MG capsule Take 50 mg by mouth every morning  , Starting Sun 9/27/2020, Historical Med           No discharge procedures on file. PDMP Review     None           ED Provider  Attending physically available and evaluated Diego Wu. I managed the patient along with the ED Attending.     Electronically Signed by         Aureliano Dumont MD  10/08/23 1958

## 2023-10-08 NOTE — ED ATTENDING ATTESTATION
10/7/2023  Jinny HOYT, DO, saw and evaluated the patient. I have discussed the patient with the resident/non-physician practitioner and agree with the resident's/non-physician practitioner's findings, Plan of Care, and MDM as documented in the resident's/non-physician practitioner's note, except where noted. All available labs and Radiology studies were reviewed. I was present for key portions of any procedure(s) performed by the resident/non-physician practitioner and I was immediately available to provide assistance. At this point I agree with the current assessment done in the Emergency Department. I have conducted an independent evaluation of this patient a history and physical is as follows:  Medical Decision Making  28M with hx of depression, noted stressed at work and brought in for writing a poem. No noted SI or HI. No desire to harm himself. Sees a psych as outpatient. Safe for dc home. Plan dc home with close f/u. Pt re-examined and evaluated after testing and treatment. Spoke with the patient and feeling improved and sxs have resolved. Will discharge home with close f/u with pcp and instructed to return to the ED if sxs worsen or continue. Pt agrees with the plan for discharge and feels comfortable to go home with proper f/u. Advised to return for worsening or additional problems. Diagnostic tests were reviewed and questions answered. Diagnosis, care plan and treatment options were discussed. The patient understand instructions and will follow up as directed. Counseling:there was a  detailed discussion with the patient and/or guardian regarding: the historical points, exam findings, and any diagnostic results supporting the discharge diagnosis, lab results, radiology results, discharge instructions reviewed with patient and/or family/caregiver and understanding was verbalized.  Instructions given to return to the emergency department if symptoms worsen or persist, or if there are any questions or concerns that arise at home.      All labs reviewed and utilized in the medical decision making process    All radiology studies independently viewed by me and interpreted by the radiologist.          All labs reviewed and interpreted by myself   All radiology studies independently viewed by me and interpreted by myself     No orders to display       Labs Reviewed - No data to display    Clinical Impression:    Final diagnoses:   None         ED Course         Critical Care Time  Procedures

## 2024-01-02 ENCOUNTER — TELEPHONE (OUTPATIENT)
Dept: NEUROLOGY | Facility: CLINIC | Age: 29
End: 2024-01-02

## 2024-01-04 NOTE — PROGRESS NOTES
[facility]  Name: Jarvis Dyson   : 1995   Visit Type: follow-up  Referring MD / PCP:  Lela Scott DO    Assessment:  Mr. Jarvis Dyson is a 28 y.o. male with structural focal epilepsy.  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  His initial seizures back in  involved head version to the left and bilateral tonic clonic activity, which may suggest involvement of the right anterior frontal heterotopic gray matter.  He seems to be tolerating levetiracetam but over the past couple of years his dose has been increased for rare sensations of very brief head twitching activity but no loss of awareness.  There was one episode in  when he felt disconnected and fuzzy but not necessarily with altered awareness.  This sensation was different from prior episodes of head twitching.  He has co-morbid ADHD, mild autism spectrum, and underlying anxiety which makes it difficult for him to distinguish between seizures, medications, or anxiety/mood symptoms (over thinking of his symptoms).    During this visit, we reviewed that it may not be necessary to increase levetiracetam for a couple of seconds of head twitching activity that happens once in a year.  He may be having more side effects at high dose of levetiracetam.  We'll try a lower dose and monitor for more severe noticeable seizures or seizures that cause physical disability.      Plan:   1 - Reduce dose of Levetiacetam ER 500mg tab to 6 tabs daily (3000mg daily)  2 - call the office if you have recurrent focal motor seizure, twitching jerking activity.  3 - Seizure protocol  If he has a seizure that last less than 5 minutes then allow him to recover at home; just get him to the ground for safety.  Call 911 if the following conditions apply  -  "unwitnessed onset of seizure and there is a potential head injury that needs to be evaluated  - patient stops breathing or turns blue during a seizure  - if the seizure is longer than 5 minutes  - if after the seizure ends and he does not show recovery over the course of 30 minutes.  - if there are multiple seizures in 24 hours  - if the patient refused taking him medications for the past 24 hours  - if there is physical injury from the seizure  Go to www.epilepsy.com for educational videos about managing seizures at home.    4 - check levetiracetam level in 1 month  Monitor how you are feeling with lower dose of levetiracetam.  5 - follow-up in 6 months with advanced practitioner  6 - consider checking out Managing Well Epilepsy program   [email or web address]      Problem List Items Addressed This Visit          Nervous and Auditory    Localization-related (focal) (partial) symptomatic epilepsy and epileptic syndromes with complex partial seizures, not intractable, without status epilepticus (HCC) - Primary    Relevant Medications    levETIRAcetam (KEPPRA XR) 500 MG extended-release tablet    Other Relevant Orders    Levetiracetam level    Gray matter heterotopia (HCC)         Chief Complaint:  Chief Complaint    Follow-up; Seizures          HPI:    Jarvis Dyson is a 28 y.o. left handed male here for follow-up evaluation of focal epilepsy.    Interval history 1/5/2024  We tried to reviewed the last time he may have had a seizure.  There was a very stressful period at work, he does not know what happened if it was from seizure or anxiety.  It was not a grand mal.  The head twitching sensation lasted no more than 10 seconds and there was no loss of awareness.      He has not noticed any recurrence of his seizure, no further episode of forced head version to the left.    He finds that with the increase in dose of levetiracetam, he feels that he is not himself, he is more \"disjointed\".  He cannot tell if " the cognitive issue is related to his ADHD or levetiracetam.  About 4-5 days ago, he forgot to take his Vyvanse for 4 days or so, he wanted to sleep all day.  Two days ago, when he restarted his medications (Lexapro, Vyvanse, and Keppra), he had an episode of severe dizziness when he bends over.    He has not noticed much in the way of worsening anger or moodiness or no one has mentioned if his moods are worse.    Since his last episode, he has a new job with less stress, he does not have to deal with people, he is in the bus-marketing department.  He works as a .      He did not pursue any counseling services through the [facility].    AED/side effects/compliance:  Levetiracetam ER 3500mg daily    Event/Seizure semiology:  Sporadic force head version to the left  (no facial twitching or involvement of the left arm).    Recurrent force head version to the left (tries to force his head back midline and say that he is okay), then secondary generalization.  New seizure type (2022) - feeling of detachment, felt fuzzy/strange, poor word production, slurred speech, anxious, no altered awareness    Prior Epilepsy History:  Intake History 2/15/2018  From [facility]rology consultants during hospitalization 2018  Mr. Dyson is an adopted [place] male who had presented to hospital on 1/7/2018 with new onset seizure after playing video games.  He was seen by Dr. Agee from the neurology consult service.  He reported having a “semi-familiar” feeling of “his mind not being there”, turned his head to the left, then he had a generalized convulsion.  He woke up with EMT around him, feeling confused, slurring his speech.  He had a second seizure while arriving at the ED.  He reported that he had been having recurrent feelings of “cannot control his mind” and turned to the left.  These events have become more frequent, about once every few weeks.  He was subsequently started on Keppra 750mg twice a day.    Patient's  "history:  He recalled his first seizure (age 22 years old) on 1/7/2018; he noticed his head was turning to left and twitching, then he was shaking and fell down, (heard his friend called out \"Oh my God\")and unable to move the way he wanted to move, then he lost consciousness.  About 30 minutes before he had an episode of forced head movement to the left.  He denies a warning, feeling out of sorts, or jenny vu sensation.  He had a second seizure about 90 minutes later, his parents witness that he had uncontrollable forced head movement to the left (he would say I'm okay) just before he had his second convulsion.  He has had recurrent episodes of involuntary head turning to the left in the last 6-12 months, for a few seconds, there is no confusion or sensation of feeling spacey.  Just before it happens he may have a moment of . . . Disorientation (but he is not sure if this is the correct sensation).    He has a history of ADHD and on the Asperger's with mood swings and anxiety.    Summary [id number]  Started at -750.  Around 3/11/2019, couple of episodes of 1-2 seconds head twisting the to left, possibly 3 twitches.  Increased LEV to [id number], eventually switched to LEV-ER 2000mg daily for better compliance.  MRI brain study with dedicated pituitary study did not detect a pituitary adenoma.  However, three areas with cortical dysplasia (left insular cortex) or heterotopic gray matter were found (left posterior lateral frontal, right anterior frontal).    He had bouts of anger and mood swings even before starting levetiracetam.     Summary [id number]  LEV-ER 2500.  He was having 1-2 auras then on 6/5/2022 he was working on his computer, feeling of detachment, felt strange, neck tingling, then lightheadedness.  He felt fuzzy for the next 30-40 minutes.  When he turns his head quickly he gets a weird feeling (he stopped doing marital arts and parkour because of this feeling).  His Levetiracetam was increased to " 2500mg daily.   Oct 11, 2022 6:30 in the evening felt tired and cold, words weren't coming out so well. Speech came back after 5 minutes, tiredness lasted 30 min. Oct 12, 2022 7:43 pm speech slightly slurred, then feeling random confusion and anxiety. 15 min later staring into space, Turn his head his head to the left happened twice for 7 seconds each. He felt that his mind went blank for a few seconds.  He believed that he was still in the moment.  The customer and co-workers did not notice any abnormal behaviors.  He was very much aware at the time.     - His LEV-ER was increased to 3500mg daily because there was an episode of feeling twitchy and head going to the left (not sure if it was a seizure or panic attack).    Special Features  Status epilepticus: No  Self Injury Seizures: No  Precipitating Factors: None    Epilepsy Risk Factors:  Unknown maternal history because he is adopted from [facility]  Abnormal birth/: born around 37 week, hyperbilirubinemia with light therapy  Abnormal Development: he was adopted around 9 months, maybe a late walker but there were more falls.  He needed physical therapy for fine motor skills  Febrile seizures, simple: No  Febrile seizures, complex: No  CNS infection: No  Mental retardation: No  Cerebral palsy: No  Head injury (moderate/severe): No  CNS neoplasm: No  CNS malformation: No  Neurosurgical procedure: No  Stroke: No  Alcohol abuse: No  Drug abuse: No  Family history Sz/epilepsy: Unknown    Prior AEDs:  medication Time used Reason to stop   levetiracetam 2018-now           Prior workup:  x  Imagin2018 - MRI brain  No acute pathology; temporal lobes are normal, symmetric hippocampi  Incidental 3-4 mm possible pituitary microadenoma    2018 - MRI brain pituitary  Heterotopic gray matter in the left posterior lateral frontal lobe and in the right frontal lobe slightly more anteriorly  The left heterotopic gray matter extends to the surface of the left  "lateral vetnrical  Mild thickening of the left insula, possibly focal cortical dysplasia  Normal size of pituitary gland, no adenoma    12/9/2022 - MRI brain w/wo  There are heterotopic gray matter in the bilateral posterior frontal regions (on the right, heterotopia involves the middle frontal gyrus) and bilateral posterior insular region (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  3/1/2018 - Normal awake and drowsy EEG  12/9/2022 - normal awake and drowsy EEG    Labs:  7/26/2023  Levetiracetam 12.1    12/22/2023  /4.2/103/30/12/0.9/80    General exam   /60   Pulse 100   Temp 97.5 °F (36.4 °C) (Temporal)   Resp 16   Ht 5' 11\" (1.803 m)   Wt 71.2 kg (157 lb)   SpO2 95%   BMI 21.90 kg/m²    Appearance:  normocephalic, pleasant, calm  Carotids: not assessed  Cardiovascular: regular rate and rhythm and normal heart sounds  Pulmonary: clear to auscultation  Abdomen: soft, nondistended    HEENT: anicteric and moist mucus membranes / oral cavity   Fundoscopy: not assessed    Mental status  Orientation: alert and oriented to name, place, time  Fund of Knowledge: intact   Attention and Concentration:  [id number]  Current and Remote Memory:intact  Language: spontaneous speech is normal and comprehension is intact    Cranial Nerves  CN 1: not tested  CN 2: pupils equal round reactive to direct and consenual light   CN 3, 4, 6: EOMI, no nystagmus  CN 5:sensation intact to all distribution V1, V2, V3  CN 7:muscles of facial expression are symmetric  CN 8: symmetric to finger snaps  CN 9, 10:symmetric elevation of soft palate and uvula is midline  CN 11:symmetric shoulder shrug  CN 12:tongue is midline    Motor:  Bulk, Tone: normal bulk, normal tone  Pronation: no pronator drift  Strength: Patient has full strength symmetrically of shoulder abduction, biceps, triceps, wrist flexion, wrist extension, finger flexion, finger abduction, hip flexion, knee " flexion, knee extension, dorsiflexion, plantar flexion.    Abnormal movements: no abnormal movements are present    Sensory:  Lighttouch:  intact at all limbs  Romberg:not assessed    Coordination:  FNF:FNF bilaterally intact  SUZI:intact  FFM:intact  Gait/Station:normal gait and normal tandem gait    Reflexes:  Not assessed      Past Medical/Surgical History:  Past Medical History:   Diagnosis Date    ADHD     Anxiety     Asperger syndrome     Unclassified epileptic seizures (HCC) 2/15/2018     History reviewed. No pertinent surgical history.    Past Psychiatric History:  Depression: No  Anxiety: Yes, ADHD  Psychosis: No      Medications:    Current Outpatient Medications:     escitalopram (LEXAPRO) 20 mg tablet, Take 20 mg by mouth every morning, Disp: , Rfl:     levETIRAcetam (KEPPRA XR) 500 MG extended-release tablet, Take 6 tablets (3,000 mg total) by mouth daily, Disp: 540 tablet, Rfl: 3    Vyvanse 50 MG capsule, Take 50 mg by mouth every morning  , Disp: , Rfl:     Allergies:  No Known Allergies    Family history:  Family History   Adopted: Yes   Family history unknown: Yes     Social History  Living situation:  Lives with adopted parents  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 He has never used smokeless tobacco. He reports current alcohol use. He reports that he does not use drugs.     Review of Systems  A review of at least 12 organ/systems was obtained by the medical assistant and reviewed by me, including additional positives/negatives:  Neurological:  Positive for dizziness (when he stands up to fast / yesterdy it happens once), light-headedness and numbness (both feet).       The total amount of time spent with the patient along with pre-chart and post-chart preparation was 33 minutes on the calendar day of the date of service.  This included history taking, physical exam, review of ancillary testing, counseling provided to the  patient regarding diagnosis, medications, treatment, and risk management, and other communication to the patient's providers and/or family.  Start time: 8:45AM  End time: 9:18AM

## 2024-01-05 ENCOUNTER — OFFICE VISIT (OUTPATIENT)
Dept: NEUROLOGY | Facility: CLINIC | Age: 29
End: 2024-01-05
Payer: COMMERCIAL

## 2024-01-05 VITALS
OXYGEN SATURATION: 95 % | BODY MASS INDEX: 21.98 KG/M2 | RESPIRATION RATE: 16 BRPM | TEMPERATURE: 97.5 F | DIASTOLIC BLOOD PRESSURE: 60 MMHG | HEIGHT: 71 IN | HEART RATE: 100 BPM | WEIGHT: 157 LBS | SYSTOLIC BLOOD PRESSURE: 102 MMHG

## 2024-01-05 DIAGNOSIS — Q04.8 GRAY MATTER HETEROTOPIA (HCC): ICD-10-CM

## 2024-01-05 DIAGNOSIS — G40.209 LOCALIZATION-RELATED (FOCAL) (PARTIAL) SYMPTOMATIC EPILEPSY AND EPILEPTIC SYNDROMES WITH COMPLEX PARTIAL SEIZURES, NOT INTRACTABLE, WITHOUT STATUS EPILEPTICUS (HCC): Primary | ICD-10-CM

## 2024-01-05 PROCEDURE — 99214 OFFICE O/P EST MOD 30 MIN: CPT | Performed by: PSYCHIATRY & NEUROLOGY

## 2024-01-05 RX ORDER — LEVETIRACETAM 500 MG/1
3000 TABLET, FILM COATED, EXTENDED RELEASE ORAL DAILY
Qty: 540 TABLET | Refills: 3 | Status: SHIPPED | OUTPATIENT
Start: 2024-01-05

## 2024-01-05 NOTE — PATIENT INSTRUCTIONS
Plan:   1 - Reduce dose of Levetiacetam ER 500mg tab to 6 tabs daily  2 - call the office if you have recurrent focal motor seizure, twitching jerking activity.  3 - Seizure protocol  If he has a seizure that last less than 5 minutes then allow him to recover at home; just get him to the ground for safety.  Call 911 if the following conditions apply  - unwitnessed onset of seizure and there is a potential head injury that needs to be evaluated  - patient stops breathing or turns blue during a seizure  - if the seizure is longer than 5 minutes  - if after the seizure ends and he does not show recovery over the course of 30 minutes.  - if there are multiple seizures in 24 hours  - if the patient refused taking him medications for the past 24 hours  - if there is physical injury from the seizure  Go to www.epilepsy.com for educational videos about managing seizures at home.    4 - check levetiracetam level in 1 month  Monitor how you are feeling with lower dose of levetiracetam.  5 - follow-up in 6 months with advanced practitioner  6 - consider checking out Managing Well Epilepsy program   [email or web address]

## 2024-01-05 NOTE — PROGRESS NOTES
Review of Systems   Constitutional:  Negative for appetite change, fatigue and fever.   HENT: Negative.  Negative for hearing loss, tinnitus, trouble swallowing and voice change.    Eyes: Negative.  Negative for photophobia, pain and visual disturbance.   Respiratory: Negative.  Negative for shortness of breath.    Cardiovascular: Negative.  Negative for palpitations.   Gastrointestinal: Negative.  Negative for nausea and vomiting.   Endocrine: Negative.  Negative for cold intolerance.   Genitourinary: Negative.  Negative for dysuria, frequency and urgency.   Musculoskeletal:  Negative for back pain, gait problem, myalgias, neck pain and neck stiffness.   Skin: Negative.  Negative for rash.   Allergic/Immunologic: Negative.    Neurological:  Positive for dizziness (when he stands up to fast / yesterdy it happens once), light-headedness and numbness (both feet). Negative for tremors, seizures, syncope, facial asymmetry, speech difficulty, weakness and headaches.   Hematological: Negative.  Does not bruise/bleed easily.   Psychiatric/Behavioral: Negative.  Negative for confusion, hallucinations and sleep disturbance.    All other systems reviewed and are negative.

## 2024-02-02 ENCOUNTER — TELEPHONE (OUTPATIENT)
Dept: NEUROLOGY | Facility: CLINIC | Age: 29
End: 2024-02-02

## 2024-02-02 DIAGNOSIS — G40.209 LOCALIZATION-RELATED (FOCAL) (PARTIAL) SYMPTOMATIC EPILEPSY AND EPILEPTIC SYNDROMES WITH COMPLEX PARTIAL SEIZURES, NOT INTRACTABLE, WITHOUT STATUS EPILEPTICUS (HCC): ICD-10-CM

## 2024-02-02 RX ORDER — LEVETIRACETAM 500 MG/1
3000 TABLET, FILM COATED, EXTENDED RELEASE ORAL DAILY
Qty: 540 TABLET | Refills: 3 | Status: SHIPPED | OUTPATIENT
Start: 2024-02-02

## 2024-02-02 NOTE — TELEPHONE ENCOUNTER
Patient wants to change pharmacy for where Keppra is going to. Please send it to [facility] in [place] instead of [facility].

## 2024-10-11 ENCOUNTER — TELEPHONE (OUTPATIENT)
Age: 29
End: 2024-10-11

## 2024-10-11 DIAGNOSIS — G40.209 LOCALIZATION-RELATED (FOCAL) (PARTIAL) SYMPTOMATIC EPILEPSY AND EPILEPTIC SYNDROMES WITH COMPLEX PARTIAL SEIZURES, NOT INTRACTABLE, WITHOUT STATUS EPILEPTICUS (HCC): Primary | ICD-10-CM

## 2024-10-11 NOTE — TELEPHONE ENCOUNTER
Pt stated he is having aura 4 times a week in the morning right when he wakes up and then it goes away roughly in 2 minutes. Pt has not had an aura since Oct 2023.     Pt missed his medication on 09/28 and had a seizure on 09/29 and started the medication back up on 09/30/2024.     Pt seizure was about 8-10 seconds @ 938am when his seizure started and was conscious the whole time and when it was over he felt he was snarling to catch his breathe. Pt stated the day of and after he was tired.     Pt is currently scheduled to be seen with his doctor on 11/8/2024 w/ . Pt is concerned if this affects his license.

## 2024-10-14 NOTE — TELEPHONE ENCOUNTER
"Called to triage seizure activity and spoke with pt. He reports having 1 seizure, his first in some 5 years, back at the end of September. He has since been having a few auras per week, each lasting only seconds. Please see below for details.    Please advise. Thank you.      Answer Assessment - Initial Assessment Questions  Seizure description: Pt reports that at about 4 am on 9/29/24, he woke and felt an uneasiness, almost like he was going to have a seizure. He got up, went into another room and meditated and the feeling went away and pt went back to sleep. At about 9:33 am, pt woke up from sleeping and his \"brain didn't feel right\". He reports that his neck stiffened up and went to the left, followed by a few seconds of convulsing. The entire event last about 15 seconds. He was aware the entire time. He states that he was snarling to catch his breath. He notes that his speech was slurred for roughly and hour and then returned to normal. He stated that he felt tired for a full day. Has had no seizures since, but has had auras, usually after waking up and looking at his phone and then has an aura. Pt was running out of medication for 2 days and took 1 (500 mg) pill as opposed to 6 pills for 2 days straight (9/28 and 9/29).    Witnessed? No.    Duration? 15 seconds.    Multiple Seizures? No.    Brought to the ER? No.     Usual type of seizure? Yes.    Sleep deprivation? Yes.    Missed Medications? Yes. He lowered his dose of Keppra to 500 mg X's 2 days, instead of his usual 3,000 mg daily, as he was running out of medications (Pt states that it was his fault for not getting them filled in time.    Recently/Currently ill? No.    Any new medicatons (including OTC). No    ETOH or Drug use? No.    New Stressors? A bit more stress than average.    Current Medications confirmed as:  1. Levetiracetam XR 3,000 mg daily. Pt takes in the morning.    Protocols used: Seizure-ADULT-[place]    "

## 2024-10-14 NOTE — TELEPHONE ENCOUNTER
If he had a convulsive seizure, then it has to be reported to [facility] and he cannot drive for 6 months.  If he only had auras or subtle jerking of his head without loss of consciousness or loss of awareness, then he may continue to drive.  But based on the description that he was convulsing, then we have to report this seizure to [facility] (Please complete a form) and he cannot drive for 6 months.    From what he is describing, he had only taken Levetiracetam ER 500mg at bedtime for 2 nights which triggered him to have a seizure.   If he is still having auras then he can increase Levetiracetam ER to 3500mg at bedtime. (Let me know if he is okay with the dose increase.)    Will recommend an EEG study for now, then will discuss if further EEG studies are going to be necessarily in the future.

## 2024-10-15 NOTE — TELEPHONE ENCOUNTER
Reason for Disposition  • Stopped taking seizure medicine (anticonvulsant)    Protocols used: Seizure-ADULT-[place]

## 2024-10-15 NOTE — TELEPHONE ENCOUNTER
"Inbound call received from Patient responding to voicemail warm transferred by [facility]. Patient was provided Dr. Leon's advisement and he verbalized understanding.     Patient was asked what happened the during his seizure. Patient asked what an aura was and was told it is different for patients and appear in various ways, it can be colors, scents, or sensations for example. Patient explained he felt the feeling of uneasiness, which he thinks is his aura. Patient felt his neck twitch, then he felt tense. He had a \"bit of jerkiness for a few seconds that stopped.\" Jerkiness was \"mostly in the neck.\" Patient denied jerking of arms and legs, then it just stopped after about 10 seconds. He did not loose consciousness and just thought \"oh god that sucked.\" Afterwards his speech was slurry but it came back and he went about his day like normal.     Patient works 4 pm to 12 am for work but feels fine at work and does not think it interferes with his medication schedule. Patient states his auras, the dizziness and a overwhelming feeling of uneasiness, typically happen in the morning. Broderickt also said he has the feeling of uneasiness if he gets up too fast. Patient said he has this sensation/aura every other day in the morning and it goes away in seconds.     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 Patient said he never felt an aura when he is driving, probably because he is focused. Gordo feels auras more when he is more stressed. Patient states he never lost consciousness and it was mostly his neck jerking.     Patient agrees an increase in medication would be good. [facility]  [id number] [place], [place] - [address]  [address] [place], [place] [address]  Phone: [phone]  Fax: [phone]     Patient might be interested on EEG but wants more information on what " it would be like first. Gordo suggested discussing it at his appointment 11/08/2024.     Patient states his best call back number is [phone] and we may leave a detailed voicemail if needed.     Dr. Leon please advise, thank you!

## 2024-11-05 ENCOUNTER — OFFICE VISIT (OUTPATIENT)
Dept: NEUROLOGY | Facility: CLINIC | Age: 29
End: 2024-11-05
Payer: COMMERCIAL

## 2024-11-05 VITALS
HEIGHT: 71 IN | TEMPERATURE: 64 F | BODY MASS INDEX: 22.4 KG/M2 | RESPIRATION RATE: 18 BRPM | DIASTOLIC BLOOD PRESSURE: 64 MMHG | WEIGHT: 160 LBS | OXYGEN SATURATION: 94 % | SYSTOLIC BLOOD PRESSURE: 102 MMHG

## 2024-11-05 DIAGNOSIS — G40.209 LOCALIZATION-RELATED (FOCAL) (PARTIAL) SYMPTOMATIC EPILEPSY AND EPILEPTIC SYNDROMES WITH COMPLEX PARTIAL SEIZURES, NOT INTRACTABLE, WITHOUT STATUS EPILEPTICUS (HCC): Primary | ICD-10-CM

## 2024-11-05 PROCEDURE — 99214 OFFICE O/P EST MOD 30 MIN: CPT | Performed by: PHYSICIAN ASSISTANT

## 2024-11-05 NOTE — ASSESSMENT & PLAN NOTE
Mr. Jarvis Dyson is a 29 y.o. male with structural focal epilepsy. 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 His initial seizures back in 2018 involved head version to the left and bilateral tonic clonic activity, which may suggest involvement of the right anterior frontal heterotopic gray matter. He seems to be tolerating levetiracetam but over the past couple of years his dose has been increased for rare sensations of very brief head twitching activity but no loss of awareness. There was one episode in 2022 when he felt disconnected and fuzzy but not necessarily with altered awareness. This sensation was different from prior episodes of head twitching. He has co-morbid ADHD, mild autism spectrum, and underlying anxiety which makes it difficult for him to distinguish between seizures, medications, or anxiety/mood symptoms (over thinking of his symptoms).     He had a recent seizure on 9/29/24 (aura of feeling unwell then hours later had turning of his head to the left with brief twitching, fatigue afterwards).  There was no altered awareness or LOC.  He was fully aware during the seizure.  This occurred when he decreased his levetiracetam from 3000mg/day to 500mg/day for 2 days before this seizure happened (did not order his medication on time and was running out).  He went back to 3000mg/day and has not had any auras or seizures since then (he had some AM auras for a few days right after increasing the med back to prior dosing).  Routine EEG was ordered, not yet completed.   Since he had no altered awareness or LOC, he may continue to drive.  Seizure provoked due to medication non-compliance.    We discussed the importance of medication compliance in detail today.       Plan:   - continue levetiracetam XR 500mg tabs take 6 tablets  (3000mg) once a day  - routine EEG  - check a levetiracetam level (blood work) in about 1-2 weeks  - call the office if any further seizures or auras  - make sure to order medication on time and not have any lapses in medication in order to keep consistent dosing  - if there are any seizures that involve loss of consciousness or altered awareness, you cannot drive x 6 months   - follow up in 6 months or sooner if needed     Orders:    Levetiracetam level; Future

## 2024-11-05 NOTE — PROGRESS NOTES
Ambulatory Visit  Name: Jarvis Dyson      : 1995      MRN: [id number]  Encounter Provider: Chinyere Ball PA-C  Encounter Date: 2024   Encounter department: [facility] [place]    Assessment & Plan  Localization-related (focal) (partial) symptomatic epilepsy and epileptic syndromes with complex partial seizures, not intractable, without status epilepticus (HCC)  Mr. Jarvis Dyson is a 29 y.o. male with structural focal epilepsy. 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 His initial seizures back in  involved head version to the left and bilateral tonic clonic activity, which may suggest involvement of the right anterior frontal heterotopic gray matter. He seems to be tolerating levetiracetam but over the past couple of years his dose has been increased for rare sensations of very brief head twitching activity but no loss of awareness. There was one episode in  when he felt disconnected and fuzzy but not necessarily with altered awareness. This sensation was different from prior episodes of head twitching. He has co-morbid ADHD, mild autism spectrum, and underlying anxiety which makes it difficult for him to distinguish between seizures, medications, or anxiety/mood symptoms (over thinking of his symptoms).     He had a recent seizure on 24 (aura of feeling unwell then hours later had turning of his head to the left with brief twitching, fatigue afterwards).  There was no altered awareness or LOC.  He was fully aware during the seizure.  This occurred when he decreased his levetiracetam from 3000mg/day to 500mg/day for 2 days before this seizure happened (did not order his medication on time and was running out).  He went back to 3000mg/day and has not had any auras or seizures since then  (he had some AM auras for a few days right after increasing the med back to prior dosing).  Routine EEG was ordered, not yet completed.   Since he had no altered awareness or LOC, he may continue to drive.  Seizure provoked due to medication non-compliance.    We discussed the importance of medication compliance in detail today.       Plan:   - continue levetiracetam XR 500mg tabs take 6 tablets (3000mg) once a day  - routine EEG  - check a levetiracetam level (blood work) in about 1-2 weeks  - call the office if any further seizures or auras  - make sure to order medication on time and not have any lapses in medication in order to keep consistent dosing  - if there are any seizures that involve loss of consciousness or altered awareness, you cannot drive x 6 months   - follow up in 6 months or sooner if needed     Orders:    Levetiracetam level; Future        History of Present Illness   HPI  Jarvis Dyson is a 29 y.o. left handed male here for follow-up evaluation of focal epilepsy.     Interval history 11/4/2024  He was last seen by Dr. Leon on 1/5/24.    There was concern he was having more side effects with a higher dose of levetiracetam, and they discussed some of the episodes he was having may be more related to mood/anxiety (overthinking his symptoms) rather than seizure.  Dose was changed from 3500mg/day to 3000mg/day.    Patient contacted the office in late June saying he was running out of medication and self-reduced his levetiracetam ER to 2000mg/day for a while.  Dr. Leon advised going up to 2500mg/day.    More recently, patient contacted our office on 10/11/24 reporting he had a seizure on 9/29/24 and then having auras in the morning on awakening several times a week.  Per nursing triage:  Seizure description: Pt reports that at about 4 am on 9/29/24, he woke and felt an uneasiness, almost like he was going to have a seizure. He got up, went into another room and meditated and the feeling went away and  "pt went back to sleep. At about 9:33 am, pt woke up from sleeping and his \"brain didn't feel right\". He reports that his neck stiffened up and went to the left, followed by a few seconds of convulsing. The entire event last about 15 seconds. He was aware the entire time. He states that he was snarling to catch his breath. He notes that his speech was slurred for roughly and hour and then returned to normal. He stated that he felt tired for a full day. Has had no seizures since, but has had auras, usually after waking up and looking at his phone and then has an aura. Pt was running out of medication for 2 days and took 1 (500 mg) pill as opposed to 6 pills for 2 days straight (9/28 and 9/29).    Dr. Leon advised sending a [employer] reporting form and advised patient not drive.  He advised increasing the dose of levetiracetam ER to 3500mg HS.  He also advised routine EEG.      Today, patient reports he has been doing well.   He tells me he never reduced his dose from 3500mg to 3000mg initially.  In June he had self-reduced to 2000mg/day while running out of meds, and then increased back to 3000mg daily (not 2500mg as Dr. Leon initially advised--he went back to what was advised at the Jan 2024 OV).  He was doing just fine on 3000mg/day until he was again running out of meds and took just 500mg/day for a few days leading up to his seizure on 9/29/24.  He woke up in the middle of the night and just \"did not feel well\".  He went back to sleep.  When he woke up again around 9am he had a seizure with neck stiffening and turning to the left and some quick twitching of the neck.  He was completely aware the entire time, there was no altered awareness or LOC (descriptions from nurses as \"convulsions\" are not accurate, per patient).  He was tired the whole day, speech was a little funny for about an hour after.  He was then having some auras of just \"feeling uneasy\" in the AM a few days a week for 1-2 weeks.  He was told to " "increase dose to 3500mg/day, and says he may have done that for a week or 2, but says he \"forgot that\" and is now back down to 3000mg/day.  He has not had any seizures or auras since that time.  He continued to drive since he had no altered awareness or LOC (does not seem [employer] form was ever filled out).    Otherwise, he is doing well.  He is working 4pm-12am, feels like he sleeps well at night (better than when he was younger, he tries to stay off his phone too much).     AED/side effects/compliance:  Levetiracetam ER 3000mg daily      Event/Seizure semiology:  Sporadic force head version to the left (no facial twitching or involvement of the left arm).   Recurrent force head version to the left (tries to force his head back midline and say that he is okay), then secondary generalization.  New seizure type (2022) - feeling of detachment, felt fuzzy/strange, poor word production, slurred speech, anxious, no altered awareness     Prior Epilepsy History:  Intake History 2/15/2018  From [facility]rology consultants during hospitalization 2018  Mr. Dyson is an adopted [place] male who had presented to hospital on 1/7/2018 with new onset seizure after playing video games. He was seen by Dr. Agee from the neurology consult service. He reported having a “semi-familiar” feeling of “his mind not being there”, turned his head to the left, then he had a generalized convulsion. He woke up with EMT around him, feeling confused, slurring his speech. He had a second seizure while arriving at the ED. He reported that he had been having recurrent feelings of “cannot control his mind” and turned to the left. These events have become more frequent, about once every few weeks. He was subsequently started on Keppra 750mg twice a day.     Patient's history:  He recalled his first seizure (age 22 years old) on 1/7/2018; he noticed his head was turning to left and twitching, then he was shaking and fell down, (heard his friend called out \"Oh " "my God\")and unable to move the way he wanted to move, then he lost consciousness. About 30 minutes before he had an episode of forced head movement to the left. He denies a warning, feeling out of sorts, or jenny vu sensation. He had a second seizure about 90 minutes later, his parents witness that he had uncontrollable forced head movement to the left (he would say I'm okay) just before he had his second convulsion.  He has had recurrent episodes of involuntary head turning to the left in the last 6-12 months, for a few seconds, there is no confusion or sensation of feeling spacey. Just before it happens he may have a moment of . . . Disorientation (but he is not sure if this is the correct sensation).     He has a history of ADHD and on the Asperger's with mood swings and anxiety.     Summary [id number]  Started at -750. Around 3/11/2019, couple of episodes of 1-2 seconds head twisting the to left, possibly 3 twitches. Increased LEV to [id number], eventually switched to LEV-ER 2000mg daily for better compliance. MRI brain study with dedicated pituitary study did not detect a pituitary adenoma. However, three areas with cortical dysplasia (left insular cortex) or heterotopic gray matter were found (left posterior lateral frontal, right anterior frontal).   He had bouts of anger and mood swings even before starting levetiracetam.      Summary [id number]  LEV-ER 2500. He was having 1-2 auras then on 6/5/2022 he was working on his computer, feeling of detachment, felt strange, neck tingling, then lightheadedness. He felt fuzzy for the next 30-40 minutes. When he turns his head quickly he gets a weird feeling (he stopped doing marital arts and parkour because of this feeling). His Levetiracetam was increased to 2500mg daily.   Oct 11, 2022 6:30 in the evening felt tired and cold, words weren't coming out so well. Speech came back after 5 minutes, tiredness lasted 30 min. Oct 12, 2022 7:43 pm speech slightly slurred, " "then feeling random confusion and anxiety. 15 min later staring into space, Turn his head his head to the left happened twice for 7 seconds each. He felt that his mind went blank for a few seconds. He believed that he was still in the moment. The customer and co-workers did not notice any abnormal behaviors. He was very much aware at the time.   2023 - His LEV-ER was increased to 3500mg daily because there was an episode of feeling twitchy and head going to the left (not sure if it was a seizure or panic attack).    Interval history 1/5/2024  We tried to reviewed the last time he may have had a seizure. There was a very stressful period at work, he does not know what happened if it was from seizure or anxiety. It was not a grand mal. The head twitching sensation lasted no more than 10 seconds and there was no loss of awareness.      He has not noticed any recurrence of his seizure, no further episode of forced head version to the left.   He finds that with the increase in dose of levetiracetam, he feels that he is not himself, he is more \"disjointed\". He cannot tell if the cognitive issue is related to his ADHD or levetiracetam.  About 4-5 days ago, he forgot to take his Vyvanse for 4 days or so, he wanted to sleep all day. Two days ago, when he restarted his medications (Lexapro, Vyvanse, and Keppra), he had an episode of severe dizziness when he bends over.   He has not noticed much in the way of worsening anger or moodiness or no one has mentioned if his moods are worse.   Since his last episode, he has a new job with less stress, he does not have to deal with people, he is in the bus-marketing department. He works as a .      He did not pursue any counseling services through the [facility].     Special Features  Status epilepticus: No  Self Injury Seizures: No  Precipitating Factors: None     Epilepsy Risk Factors:  Unknown maternal history because he is adopted from [place]  Abnormal " birth/: born around 37 week, hyperbilirubinemia with light therapy  Abnormal Development: he was adopted around 9 months, maybe a late walker but there were more falls. He needed physical therapy for fine motor skills  Febrile seizures, simple: No  Febrile seizures, complex: No  CNS infection: No  Mental retardation: No  Cerebral palsy: No  Head injury (moderate/severe): No  CNS neoplasm: No  CNS malformation: No  Neurosurgical procedure: No  Stroke: No  Alcohol abuse: No  Drug abuse: No  Family history Sz/epilepsy: Unknown     Prior AEDs:  medication Time used Reason to stop   levetiracetam -now                Prior workup:  x  Imagin2018 - MRI brain  No acute pathology; temporal lobes are normal, symmetric hippocampi  Incidental 3-4 mm possible pituitary microadenoma     2018 - MRI brain pituitary  Heterotopic gray matter in the left posterior lateral frontal lobe and in the right frontal lobe slightly more anteriorly  The left heterotopic gray matter extends to the surface of the left lateral vetnrical  Mild thickening of the left insula, possibly focal cortical dysplasia  Normal size of pituitary gland, no adenoma     2022 - MRI brain w/wo  There are heterotopic gray matter in the bilateral posterior frontal regions (on the right, heterotopia involves the middle frontal gyrus) and bilateral posterior insular region (polymicrogyria on sagittal sequence)     EEGs:  2018 - Normal awake and asleep EEG.  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 He has never used smokeless tobacco. He reports current alcohol use. He reports that he does not use drugs.     Review of Systems   Constitutional:  "Negative.  Negative for chills, fatigue and fever.   HENT: Negative.  Negative for hearing loss, tinnitus and trouble swallowing.    Eyes:  Negative for photophobia, pain and visual disturbance.   Respiratory: Negative.  Negative for cough and shortness of breath.    Cardiovascular: Negative.  Negative for chest pain and palpitations.   Gastrointestinal:  Negative for constipation, diarrhea, nausea and vomiting.   Endocrine: Negative.    Genitourinary: Negative.  Negative for difficulty urinating and urgency.   Musculoskeletal: Negative.  Negative for gait problem.   Skin: Negative.  Negative for rash.   Neurological:  Positive for seizures (9/29/2024). Negative for dizziness, tremors, weakness, numbness and headaches.   Hematological: Negative.    Psychiatric/Behavioral:  Negative for confusion and sleep disturbance.      I have personally reviewed the MA's review of systems and made changes as necessary.    Current Outpatient Medications on File Prior to Visit   Medication Sig Dispense Refill    escitalopram (LEXAPRO) 20 mg tablet Take 20 mg by mouth every morning      levETIRAcetam (KEPPRA XR) 500 MG extended-release tablet Take 6 tablets (3,000 mg total) by mouth daily 540 tablet 3    Vyvanse 50 MG capsule Take 50 mg by mouth every morning         No current facility-administered medications on file prior to visit.      Objective     /64 (BP Location: Left arm, Patient Position: Sitting, Cuff Size: Large)   Temp (!) 64 °F (17.8 °C) (Temporal)   Resp 18   Ht 5' 11\" (1.803 m)   Wt 72.6 kg (160 lb)   SpO2 94%   BMI 22.32 kg/m²     Physical Exam  Constitutional:       Appearance: Normal appearance.   Eyes:      Extraocular Movements: EOM normal.      Pupils: Pupils are equal, round, and reactive to light.   Neurological:      Mental Status: He is alert and oriented to person, place, and time.      Motor: Motor strength is normal.     Coordination: Finger-Nose-Finger Test normal.      Gait: Gait is " intact.   Psychiatric:         Mood and Affect: Mood normal.         Speech: Speech normal.         Behavior: Behavior normal.       Neurologic Exam     Mental Status   Oriented to person, place, and time.   Attention: normal. Concentration: normal.   Speech: speech is normal   Level of consciousness: alert  Normal comprehension.     Cranial Nerves     CN II   Visual fields full to confrontation.     CN III, IV, VI   Pupils are equal, round, and reactive to light.  Extraocular motions are normal.     CN V   Facial sensation intact.     CN VII   Facial expression full, symmetric.     CN VIII   CN VIII normal.     CN IX, X   CN IX normal.   CN X normal.     CN XI   CN XI normal.     CN XII   CN XII normal.     Motor Exam     Strength   Strength 5/5 throughout.     Sensory Exam   Light touch normal.     Gait, Coordination, and Reflexes     Gait  Gait: normal    Coordination   Finger to nose coordination: normal

## 2024-11-05 NOTE — PATIENT INSTRUCTIONS
- continue levetiracetam XR 500mg tabs take 6 tablets (3000mg) once a day  - routine EEG  - check a levetiracetam level (blood work) in about 1-2 weeks  - call the office if any further seizures or auras  - make sure to order medication on time and not have any lapses in medication in order to keep consistent dosing  - if there are any seizures that involve loss of consciousness or altered awareness, you cannot drive x 6 months   - follow up in 6 months or sooner if needed

## 2024-11-18 ENCOUNTER — RESULTS FOLLOW-UP (OUTPATIENT)
Dept: OTHER | Facility: HOSPITAL | Age: 29
End: 2024-11-18

## 2024-11-18 ENCOUNTER — HOSPITAL ENCOUNTER (OUTPATIENT)
Dept: NEUROLOGY | Facility: CLINIC | Age: 29
Discharge: HOME | End: 2024-11-18
Payer: COMMERCIAL

## 2024-11-18 DIAGNOSIS — G40.209 LOCALIZATION-RELATED (FOCAL) (PARTIAL) SYMPTOMATIC EPILEPSY AND EPILEPTIC SYNDROMES WITH COMPLEX PARTIAL SEIZURES, NOT INTRACTABLE, WITHOUT STATUS EPILEPTICUS (HCC): ICD-10-CM

## 2024-11-18 PROCEDURE — 95816 EEG AWAKE AND DROWSY: CPT

## 2024-11-18 PROCEDURE — 95816 EEG AWAKE AND DROWSY: CPT | Performed by: PSYCHIATRY & NEUROLOGY

## 2024-11-19 NOTE — TELEPHONE ENCOUNTER
"----- Message from Antony Leon MD sent at 11/18/2024  5:08 PM EST -----  EEG study is normal; If he is still having episodes of \"jerking\", \"feeling of uneasiness\", then will request that he completes a video EEG monitoring study to characterize his seizures/clinical events. This would be an EMU study.  "

## 2024-11-19 NOTE — TELEPHONE ENCOUNTER
Called re: below and spoke with pt, who verbalized an understanding.     He denies an jerking movements or feelings of uneasiness at this time.    CEASARI.

## 2024-12-26 DIAGNOSIS — G40.209 LOCALIZATION-RELATED (FOCAL) (PARTIAL) SYMPTOMATIC EPILEPSY AND EPILEPTIC SYNDROMES WITH COMPLEX PARTIAL SEIZURES, NOT INTRACTABLE, WITHOUT STATUS EPILEPTICUS (HCC): ICD-10-CM

## 2024-12-26 RX ORDER — LEVETIRACETAM 500 MG/1
500 TABLET, FILM COATED, EXTENDED RELEASE ORAL
Qty: 180 TABLET | Refills: 0 | Status: SHIPPED | OUTPATIENT
Start: 2024-12-26

## 2024-12-26 RX ORDER — LEVETIRACETAM 500 MG/1
3000 TABLET, FILM COATED, EXTENDED RELEASE ORAL DAILY
Qty: 540 TABLET | Refills: 0 | Status: SHIPPED | OUTPATIENT
Start: 2024-12-26

## 2024-12-26 NOTE — TELEPHONE ENCOUNTER
Reason for call: Not a duplicate. 90 day supply to [facility].   [x] Refill   [] Prior Auth  [] Other:     Office: [facility]   [] PCP/Provider -   [x] Specialty/Provider - Neurology/ Antony Leon     Medication: levETIRAcetam (KEPPRA     Dose/Frequency: 500 mg XR/ 6 tabs daily     Quantity: 90 day supply     Pharmacy: [facility]     Does the patient have enough for 3 days?   [] Yes   [x] No - Send as HP to POD- patient is out completely.       Reason for call: Not a duplicate. Send 30 day supply to [facility].   [x] Refill   [] Prior Auth  [] Other:     Office: [facility]   [] PCP/Provider -   [x] Specialty/Provider - Neurology/ Antony Leon     Medication: levETIRAcetam (KEPPRA     Dose/Frequency: 500 mg XR/ 6 tabs daily     Quantity: 30 day supply     Pharmacy: [facility] in [place]     Does the patient have enough for 3 days?   [] Yes   [x] No - Send as HP to POD- patient is out completely.

## 2025-01-10 DIAGNOSIS — G40.209 LOCALIZATION-RELATED (FOCAL) (PARTIAL) SYMPTOMATIC EPILEPSY AND EPILEPTIC SYNDROMES WITH COMPLEX PARTIAL SEIZURES, NOT INTRACTABLE, WITHOUT STATUS EPILEPTICUS (HCC): ICD-10-CM

## 2025-01-10 RX ORDER — LEVETIRACETAM 500 MG/1
TABLET, FILM COATED, EXTENDED RELEASE ORAL
Qty: 180 TABLET | Refills: 5 | Status: SHIPPED | OUTPATIENT
Start: 2025-01-10

## 2025-02-05 LAB — LEVETIRACETAM SERPL-MCNC: 32 MCG/ML

## 2025-05-15 NOTE — PROGRESS NOTES
Name: Jarvis Dyson      : 1995      MRN: [id number]  Encounter Provider: Antony Leon MD  Encounter Date: 2025   Encounter department: [facility]  :  Assessment & Plan  Localization-related (focal) (partial) symptomatic epilepsy and epileptic syndromes with complex partial seizures, not intractable, without status epilepticus (HCC)  1 - Continue with Levetiacetam ER 500mg tab 6 tabs daily (3000mg daily)  2 - call the office if you have recurrent focal motor seizure, twitching jerking activity.  3 - reviewed medication compliance and that he needs to call the office if he is running out of medication so that a temporary supply can be obtained from a local pharmacy.  Orders:    levETIRAcetam (KEPPRA XR) 500 MG extended-release tablet; Take 6 tablets (3,000 mg total) by mouth daily      Assessment:  Mr. Jarvis Dyson is a 30 y.o. 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  His initial seizures back in 2018 involved head version to the left and bilateral tonic clonic activity, which may suggest involvement of the right anterior frontal heterotopic gray matter.  He generally does well with levetiracetam in prevention of seizures, last breakthrough seizure (focal motor aware seizure) occurred in the setting of reducing dose as he forgot to order his medication.  He has done better with making sure he gets his medication on time.  Overall mood is good today (recent trip to [place]).  He has co-morbid ADHD and anxiety disorder.      Plan:   1 - Continue with Levetiacetam ER 500mg tab 6 tabs daily (3000mg daily)  2 - call the office if you have recurrent focal motor seizure, twitching jerking activity.  3 - Seizure protocol  If he  has a seizure that last less than 5 minutes then allow him to recover at home; just get him to the ground for safety.  Call 911 if the following conditions apply  - unwitnessed onset of seizure and there is a potential head injury that needs to be evaluated  - patient stops breathing or turns blue during a seizure  - if the seizure is longer than 5 minutes  - if after the seizure ends and he does not show recovery over the course of 30 minutes.  - if there are multiple seizures in 24 hours  - if the patient refused taking him medications for the past 24 hours  - if there is physical injury from the seizure    Follow-up in 6 months with advanced practitioner    Chief Complaint:  Chief Complaint    Follow-up; Seizures       HPI:    Jarvis Dyson is a 30 y.o. left handed male here for follow-up evaluation of focal epilepsy.    Interval History 5/16/2025  He was last seen by Chinyere Ball on 11/5/2024 - there was a period in June when he was running out of medication when he reduced LEV to 2000mg/day.  9/29/2024, he had a seizure (office contacted 10/11/2024), he was having auras in the morning, then at 9:33AM work up not feeling right, neck stiffened up, went to the left, seconds of convulsing (constant twitching), last 15 seconds, he was aware, slurred speech for an hour.  He had missed a dose on 9/28/2024 and had lower his dose of medication as he was running out of medication.  He had just come back from a tour of [place] for 11 days, no seizures during that time he was away.    He has not had a recurrent seizure since 9/29/2024.    He has been more consistent with taking his medication and the current dose, he has his medication on automatic refill with medications delivered by mail.     AED/side effects/compliance:  Levetiracetam ER 3000mg daily    Event/Seizure semiology:  Sporadic force head version to the left  (no facial twitching or involvement of the left arm).    Recurrent force head version to the  "left (tries to force his head back midline and say that he is okay), then secondary generalization.  New seizure type (2022) - feeling of detachment, felt fuzzy/strange, poor word production, slurred speech, anxious, no altered awareness    Prior Epilepsy History:  Intake History 2/15/2018  From [facility] during hospitalization 2018  Mr. Dyson is an adopted [place] male who had presented to hospital on 1/7/2018 with new onset seizure after playing video games.  He was seen by Dr. Agee from the neurology consult service.  He reported having a “semi-familiar” feeling of “his mind not being there”, turned his head to the left, then he had a generalized convulsion.  He woke up with EMT around him, feeling confused, slurring his speech.  He had a second seizure while arriving at the ED.  He reported that he had been having recurrent feelings of “cannot control his mind” and turned to the left.  These events have become more frequent, about once every few weeks.  He was subsequently started on Keppra 750mg twice a day.    Patient's history:  He recalled his first seizure (age 22 years old) on 1/7/2018; he noticed his head was turning to left and twitching, then he was shaking and fell down, (heard his friend called out \"Oh my God\")and unable to move the way he wanted to move, then he lost consciousness.  About 30 minutes before he had an episode of forced head movement to the left.  He denies a warning, feeling out of sorts, or jenny vu sensation.  He had a second seizure about 90 minutes later, his parents witness that he had uncontrollable forced head movement to the left (he would say I'm okay) just before he had his second convulsion.  He has had recurrent episodes of involuntary head turning to the left in the last 6-12 months, for a few seconds, there is no confusion or sensation of feeling spacey.  Just before it happens he may have a moment of . . . Disorientation (but he is not sure if this is the " correct sensation).    He has a history of ADHD and on the Asperger's with mood swings and anxiety.    Summary [id number]  Started at -750.  Around 3/11/2019, couple of episodes of 1-2 seconds head twisting the to left, possibly 3 twitches.  Increased LEV to [id number], eventually switched to LEV-ER 2000mg daily for better compliance.  MRI brain study with dedicated pituitary study did not detect a pituitary adenoma.  However, three areas with cortical dysplasia (left insular cortex) or heterotopic gray matter were found (left posterior lateral frontal, right anterior frontal).    He had bouts of anger and mood swings even before starting levetiracetam.     Summary [id number]  LEV-ER 2500.  He was having 1-2 auras then on 6/5/2022 he was working on his computer, feeling of detachment, felt strange, neck tingling, then lightheadedness.  He felt fuzzy for the next 30-40 minutes.  When he turns his head quickly he gets a weird feeling (he stopped doing [employer] and [employer] because of this feeling).  His Levetiracetam was increased to 2500mg daily.   Oct 11, 2022 6:30 in the evening felt tired and cold, words weren't coming out so well. Speech came back after 5 minutes, tiredness lasted 30 min. Oct 12, 2022 7:43 pm speech slightly slurred, then feeling random confusion and anxiety. 15 min later staring into space, Turn his head his head to the left happened twice for 7 seconds each. He felt that his mind went blank for a few seconds.  He believed that he was still in the moment.  The customer and co-workers did not notice any abnormal behaviors.  He was very much aware at the time.    2023 - His LEV-ER was increased to 3500mg daily because there was an episode of feeling twitchy and head going to the left (not sure if it was a seizure or panic attack).    Summary 2024  LEV-ER 3500.  During the prior year there were episodes of brief head twitching activity, usually when he is stressed.  During the year, he had  difficulty with running out of medication, he would forget to order his medication (mail order pharmacy), then would start cutting down the dose to extend the medication.  We reduced the dose of levetiracetam from 3500mg to 3000mg due to potential side effect that it was causing more anxiety at higher doses.    He had a breakthrough focal aware motor seizure on 2024, when he was running out of medication and missed a dose the night before.    Special Features  Status epilepticus: No  Self Injury Seizures: No  Precipitating Factors: None    Epilepsy Risk Factors:  Unknown maternal history because he is adopted from [place]  Abnormal birth/: born around 37 week, hyperbilirubinemia with light therapy  Abnormal Development: he was adopted around 9 months, maybe a late walker but there were more falls.  He needed physical therapy for fine motor skills  Febrile seizures, simple: No  Febrile seizures, complex: No  CNS infection: No  Mental retardation: No  Cerebral palsy: No  Head injury (moderate/severe): No  CNS neoplasm: No  CNS malformation: No  Neurosurgical procedure: No  Stroke: No  Alcohol abuse: No  Drug abuse: No  Family history Sz/epilepsy: Unknown    Prior AEDs:  medication Time used Reason to stop   levetiracetam 2018-now           Prior workup:  No radiology study reviewed during this visit  Imagin2018 - MRI brain  No acute pathology; temporal lobes are normal, symmetric hippocampi  Incidental 3-4 mm possible pituitary microadenoma    2018 - MRI brain pituitary  Heterotopic gray matter in the left posterior lateral frontal lobe and in the right frontal lobe slightly more anteriorly  The left heterotopic gray matter extends to the surface of the left lateral vetnrical  Mild thickening of the left insula, possibly focal cortical dysplasia  Normal size of pituitary gland, no adenoma    2022 - MRI brain w/wo  There are heterotopic gray matter in the bilateral posterior frontal  "regions (on the right, heterotopia involves the middle frontal gyrus) and bilateral posterior insular region (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  3/1/2018 - Normal awake and drowsy EEG  12/9/2022 - normal awake and drowsy EEG  11/18/2024 - Normal awake EEG    Labs:  Component      Latest Ref Rng 2/4/2025   LEVETIRACETA (KEPPRA)      mcg/mL 32.0        General exam   /72 (BP Location: Right arm, Patient Position: Sitting, Cuff Size: Adult)   Pulse 103   Temp 97.6 °F (36.4 °C) (Temporal)   Resp 14   Ht 5' 11\" (1.803 m)   Wt 74.7 kg (164 lb 9.6 oz)   SpO2 98%   BMI 22.96 kg/m²    Appearance: normally developed, appears well  Carotids: not assessed  Cardiovascular: regular rate and rhythm and no murmur is present  Pulmonary: clear to auscultation  Abdomen: soft, nondistended  Extremities: no edema    HEENT: anicteric and moist mucus membranes / oral cavity   Fundoscopy: not assessed    Mental status  Orientation: alert and oriented to name, place, time  Fund of Knowledge: intact   Attention and Concentration: able to name the months of the year backwards  Current and Remote Memory:semantic and episodic memories are intact  Language: spontaneous speech is normal and comprehension is intact    Cranial Nerves  CN 1: not tested  CN 2: not assessed   CN 3, 4, 6: EOMI, no nystagmus  CN 5:not assessed  CN 7:muscles of facial expression are symmetric  CN 8:not assessed  CN 9, 10:no dysarthria present  CN 11:symmetric shoulder shrug  CN 12:tongue is midline    Motor:  Bulk, Tone: normal bulk, normal tone  Pronation: not assessed  Strength: Symmetric strength of the arms and legs, no lateralizing weakness   Abnormal movements: no abnormal movements are present    Sensory:  Lighttouch: intact in all limbs  Romberg:not assessed    Coordination:  FNF:FNF bilaterally intact  SUZI:intact  FFM:intact  Gait/Station:normal gait and normal tandem gait    Reflexes:  Not " assessed    Past Medical/Surgical History:  Past Medical History:   Diagnosis Date    ADHD     Anxiety     Asperger syndrome     Unclassified epileptic seizures (HCC) 2/15/2018     History reviewed. No pertinent surgical history.    Past Psychiatric History:  Depression: No  Anxiety: Yes, ADHD  Psychosis: No    Medications:    Current Outpatient Medications:     escitalopram (LEXAPRO) 20 mg tablet, Take 20 mg by mouth every morning, Disp: , Rfl:     levETIRAcetam (KEPPRA XR) 500 MG extended-release tablet, Take 6 tablets (3,000 mg total) by mouth daily, Disp: 540 tablet, Rfl: 0    Vyvanse 50 MG capsule, Take 50 mg by mouth every morning, Disp: , Rfl:     levETIRAcetam (KEPPRA XR) 500 MG extended-release tablet, TAKE ONE TABLET BY MOUTH SIX TIMES A DAY (Patient not taking: Reported on 5/16/2025), Disp: 180 tablet, Rfl: 5    Allergies:  No Known Allergies    Family history:  Family History   Adopted: Yes   Family history unknown: Yes     Social History  Living situation:  Lives with adopted parents  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 He has never used smokeless tobacco. He reports current alcohol use. He reports that he does not use drugs.     Start time: 10:03AM  End time: 10:24AM   Administrative Statements   I have spent a total time of 21 minutes in caring for this patient on the day of the visit/encounter including Prognosis, Patient and family education, Importance of tx compliance, Risk factor reductions, Impressions, Counseling / Coordination of care, Documenting in the medical record, Reviewing/placing orders in the medical record (including tests, medications, and/or procedures), and Obtaining or reviewing history  .

## 2025-05-16 ENCOUNTER — OFFICE VISIT (OUTPATIENT)
Dept: NEUROLOGY | Facility: CLINIC | Age: 30
End: 2025-05-16
Payer: COMMERCIAL

## 2025-05-16 VITALS
BODY MASS INDEX: 23.04 KG/M2 | HEIGHT: 71 IN | RESPIRATION RATE: 14 BRPM | WEIGHT: 164.6 LBS | DIASTOLIC BLOOD PRESSURE: 72 MMHG | HEART RATE: 103 BPM | SYSTOLIC BLOOD PRESSURE: 110 MMHG | OXYGEN SATURATION: 98 % | TEMPERATURE: 97.6 F

## 2025-05-16 DIAGNOSIS — G40.209 LOCALIZATION-RELATED (FOCAL) (PARTIAL) SYMPTOMATIC EPILEPSY AND EPILEPTIC SYNDROMES WITH COMPLEX PARTIAL SEIZURES, NOT INTRACTABLE, WITHOUT STATUS EPILEPTICUS (HCC): ICD-10-CM

## 2025-05-16 PROCEDURE — 99213 OFFICE O/P EST LOW 20 MIN: CPT | Performed by: PSYCHIATRY & NEUROLOGY

## 2025-05-16 RX ORDER — LEVETIRACETAM 500 MG/1
3000 TABLET, FILM COATED, EXTENDED RELEASE ORAL DAILY
Qty: 540 TABLET | Refills: 3 | Status: SHIPPED | OUTPATIENT
Start: 2025-05-16

## 2025-05-16 NOTE — PATIENT INSTRUCTIONS
1 - Continue with Levetiacetam ER 500mg tab 6 tabs daily (3000mg daily)  2 - call the office if you have recurrent focal motor seizure, twitching jerking activity.  3 - Seizure protocol  If he has a seizure that last less than 5 minutes then allow him to recover at home; just get him to the ground for safety.  Call 911 if the following conditions apply  - unwitnessed onset of seizure and there is a potential head injury that needs to be evaluated  - patient stops breathing or turns blue during a seizure  - if the seizure is longer than 5 minutes  - if after the seizure ends and he does not show recovery over the course of 30 minutes.  - if there are multiple seizures in 24 hours  - if the patient refused taking him medications for the past 24 hours  - if there is physical injury from the seizure

## 2025-05-16 NOTE — ASSESSMENT & PLAN NOTE
1 - Continue with Levetiacetam ER 500mg tab 6 tabs daily (3000mg daily)  2 - call the office if you have recurrent focal motor seizure, twitching jerking activity.  3 - reviewed medication compliance and that he needs to call the office if he is running out of medication so that a temporary supply can be obtained from a local pharmacy.  Orders:    levETIRAcetam (KEPPRA XR) 500 MG extended-release tablet; Take 6 tablets (3,000 mg total) by mouth daily

## 2025-07-22 ENCOUNTER — TELEPHONE (OUTPATIENT)
Age: 30
End: 2025-07-22

## 2025-07-22 DIAGNOSIS — G40.209 LOCALIZATION-RELATED (FOCAL) (PARTIAL) SYMPTOMATIC EPILEPSY AND EPILEPTIC SYNDROMES WITH COMPLEX PARTIAL SEIZURES, NOT INTRACTABLE, WITHOUT STATUS EPILEPTICUS (HCC): Primary | ICD-10-CM

## 2025-07-22 NOTE — TELEPHONE ENCOUNTER
Incoming call from patient with a question regarding caffeine and seizures. Patient stated he consumed a lot of caffeine last evening. Patient normally does not drink caffeine but got a free sample of [employer] that contains 100 mg of caffeine per serving. Patient drank one can and could not fall asleep. Advised in some cases, caffeine can potentially lower the seizure threshold in patients taking certain seizure medication.    Patient thinks he slept about 3 hours last night.  Event was unwitnessed  Unknown duration  Patient woke up with a bitten tongue, had dried blood in his mouth, legs feel sore.   Patient denies missing Levetiracetam 500 MG ER (3000 mg)   Patient stated he has had a seizure in his sleep in the past  Denies drug or alcohol use  Denies new stressors  Denies new OTC meds  Denies recent infections      #[phone]    Dr. Leon,  Please provide your recommendations.  Thank you!

## 2025-07-24 NOTE — TELEPHONE ENCOUNTER
Called re: below and spoke with the pt, who verbalized an understanding. He states that he will go get the level check within 24 hours. Pt requested script be sent via [employer] (Done).